# Patient Record
Sex: MALE | Race: WHITE | NOT HISPANIC OR LATINO
[De-identification: names, ages, dates, MRNs, and addresses within clinical notes are randomized per-mention and may not be internally consistent; named-entity substitution may affect disease eponyms.]

---

## 2018-06-02 ENCOUNTER — TRANSCRIPTION ENCOUNTER (OUTPATIENT)
Age: 63
End: 2018-06-02

## 2018-07-06 ENCOUNTER — EMERGENCY (EMERGENCY)
Facility: HOSPITAL | Age: 63
LOS: 1 days | Discharge: HOME | End: 2018-07-06
Attending: EMERGENCY MEDICINE | Admitting: EMERGENCY MEDICINE
Payer: COMMERCIAL

## 2018-07-06 VITALS
OXYGEN SATURATION: 97 % | DIASTOLIC BLOOD PRESSURE: 72 MMHG | TEMPERATURE: 98 F | HEART RATE: 83 BPM | RESPIRATION RATE: 18 BRPM | SYSTOLIC BLOOD PRESSURE: 120 MMHG | WEIGHT: 190.04 LBS

## 2018-07-06 DIAGNOSIS — M79.662 PAIN IN LEFT LOWER LEG: ICD-10-CM

## 2018-07-06 PROCEDURE — 99284 EMERGENCY DEPT VISIT MOD MDM: CPT

## 2018-07-06 PROCEDURE — 93971 EXTREMITY STUDY: CPT | Mod: 26,LT

## 2018-07-06 RX ORDER — IBUPROFEN 200 MG
600 TABLET ORAL ONCE
Qty: 0 | Refills: 0 | Status: COMPLETED | OUTPATIENT
Start: 2018-07-06 | End: 2018-07-06

## 2018-07-06 RX ADMIN — Medication 600 MILLIGRAM(S): at 16:57

## 2018-07-06 NOTE — ED PROVIDER NOTE - OBJECTIVE STATEMENT
63 yo M with no PMHx presents c/o left lower leg pain since yesterday afternoon. Pt states took aspirin for relief. Denies CP, SOB, HA, smoking, N/V, trauma, fall, recent flying, or other complaints.

## 2018-07-06 NOTE — ED ADULT TRIAGE NOTE - CHIEF COMPLAINT QUOTE
c/o left lower leg pain since yesterday afternoon. as per pt, thought it was a Lucas horse, took an aspirin with mild relief. denies CP, SOB, HA, smoking, trauma, fall.

## 2019-11-21 ENCOUNTER — INPATIENT (INPATIENT)
Facility: HOSPITAL | Age: 64
LOS: 3 days | Discharge: HOME | DRG: 65 | End: 2019-11-25
Attending: PSYCHIATRY & NEUROLOGY | Admitting: PSYCHIATRY & NEUROLOGY
Payer: COMMERCIAL

## 2019-11-21 VITALS
HEART RATE: 83 BPM | TEMPERATURE: 98 F | SYSTOLIC BLOOD PRESSURE: 180 MMHG | DIASTOLIC BLOOD PRESSURE: 101 MMHG | WEIGHT: 199.96 LBS | OXYGEN SATURATION: 97 % | RESPIRATION RATE: 18 BRPM

## 2019-11-21 DIAGNOSIS — Z91.89 OTHER SPECIFIED PERSONAL RISK FACTORS, NOT ELSEWHERE CLASSIFIED: ICD-10-CM

## 2019-11-21 DIAGNOSIS — Z21 ASYMPTOMATIC HUMAN IMMUNODEFICIENCY VIRUS [HIV] INFECTION STATUS: ICD-10-CM

## 2019-11-21 DIAGNOSIS — I10 ESSENTIAL (PRIMARY) HYPERTENSION: ICD-10-CM

## 2019-11-21 DIAGNOSIS — F32.9 MAJOR DEPRESSIVE DISORDER, SINGLE EPISODE, UNSPECIFIED: ICD-10-CM

## 2019-11-21 DIAGNOSIS — I63.9 CEREBRAL INFARCTION, UNSPECIFIED: ICD-10-CM

## 2019-11-21 DIAGNOSIS — R63.8 OTHER SYMPTOMS AND SIGNS CONCERNING FOOD AND FLUID INTAKE: ICD-10-CM

## 2019-11-21 LAB
ALBUMIN SERPL ELPH-MCNC: 3.4 G/DL — SIGNIFICANT CHANGE UP (ref 3.4–5)
ALP SERPL-CCNC: 68 U/L — SIGNIFICANT CHANGE UP (ref 40–120)
ALT FLD-CCNC: 43 U/L — HIGH (ref 12–42)
ANION GAP SERPL CALC-SCNC: 2 MMOL/L — LOW (ref 9–16)
APPEARANCE UR: CLEAR — SIGNIFICANT CHANGE UP
APTT BLD: 27.1 SEC — LOW (ref 27.5–36.3)
AST SERPL-CCNC: 23 U/L — SIGNIFICANT CHANGE UP (ref 15–37)
BASOPHILS # BLD AUTO: 0.04 K/UL — SIGNIFICANT CHANGE UP (ref 0–0.2)
BASOPHILS NFR BLD AUTO: 0.4 % — SIGNIFICANT CHANGE UP (ref 0–2)
BILIRUB SERPL-MCNC: 0.3 MG/DL — SIGNIFICANT CHANGE UP (ref 0.2–1.2)
BILIRUB UR-MCNC: NEGATIVE — SIGNIFICANT CHANGE UP
BUN SERPL-MCNC: 16 MG/DL — SIGNIFICANT CHANGE UP (ref 7–23)
CALCIUM SERPL-MCNC: 10.4 MG/DL — SIGNIFICANT CHANGE UP (ref 8.5–10.5)
CHLORIDE SERPL-SCNC: 109 MMOL/L — HIGH (ref 96–108)
CO2 SERPL-SCNC: 33 MMOL/L — HIGH (ref 22–31)
COLOR SPEC: YELLOW — SIGNIFICANT CHANGE UP
CREAT SERPL-MCNC: 1.25 MG/DL — SIGNIFICANT CHANGE UP (ref 0.5–1.3)
DIFF PNL FLD: NEGATIVE — SIGNIFICANT CHANGE UP
EOSINOPHIL # BLD AUTO: 0.17 K/UL — SIGNIFICANT CHANGE UP (ref 0–0.5)
EOSINOPHIL NFR BLD AUTO: 1.9 % — SIGNIFICANT CHANGE UP (ref 0–6)
GLUCOSE SERPL-MCNC: 97 MG/DL — SIGNIFICANT CHANGE UP (ref 70–99)
GLUCOSE UR QL: NEGATIVE — SIGNIFICANT CHANGE UP
HBA1C BLD-MCNC: 6 % — HIGH (ref 4–5.6)
HCT VFR BLD CALC: 45 % — SIGNIFICANT CHANGE UP (ref 39–50)
HGB BLD-MCNC: 15.2 G/DL — SIGNIFICANT CHANGE UP (ref 13–17)
IMM GRANULOCYTES NFR BLD AUTO: 0.4 % — SIGNIFICANT CHANGE UP (ref 0–1.5)
INR BLD: 0.98 — SIGNIFICANT CHANGE UP (ref 0.88–1.16)
KETONES UR-MCNC: NEGATIVE — SIGNIFICANT CHANGE UP
LEUKOCYTE ESTERASE UR-ACNC: NEGATIVE — SIGNIFICANT CHANGE UP
LYMPHOCYTES # BLD AUTO: 2.55 K/UL — SIGNIFICANT CHANGE UP (ref 1–3.3)
LYMPHOCYTES # BLD AUTO: 28.3 % — SIGNIFICANT CHANGE UP (ref 13–44)
MCHC RBC-ENTMCNC: 33.6 PG — SIGNIFICANT CHANGE UP (ref 27–34)
MCHC RBC-ENTMCNC: 33.8 GM/DL — SIGNIFICANT CHANGE UP (ref 32–36)
MCV RBC AUTO: 99.3 FL — SIGNIFICANT CHANGE UP (ref 80–100)
MONOCYTES # BLD AUTO: 1 K/UL — HIGH (ref 0–0.9)
MONOCYTES NFR BLD AUTO: 11.1 % — SIGNIFICANT CHANGE UP (ref 2–14)
NEUTROPHILS # BLD AUTO: 5.2 K/UL — SIGNIFICANT CHANGE UP (ref 1.8–7.4)
NEUTROPHILS NFR BLD AUTO: 57.9 % — SIGNIFICANT CHANGE UP (ref 43–77)
NITRITE UR-MCNC: NEGATIVE — SIGNIFICANT CHANGE UP
NRBC # BLD: 0 /100 WBCS — SIGNIFICANT CHANGE UP (ref 0–0)
PH UR: 7 — SIGNIFICANT CHANGE UP (ref 5–8)
PLATELET # BLD AUTO: 165 K/UL — SIGNIFICANT CHANGE UP (ref 150–400)
POTASSIUM SERPL-MCNC: 4.2 MMOL/L — SIGNIFICANT CHANGE UP (ref 3.5–5.3)
POTASSIUM SERPL-SCNC: 4.2 MMOL/L — SIGNIFICANT CHANGE UP (ref 3.5–5.3)
PROT SERPL-MCNC: 7 G/DL — SIGNIFICANT CHANGE UP (ref 6.4–8.2)
PROT UR-MCNC: NEGATIVE MG/DL — SIGNIFICANT CHANGE UP
PROTHROM AB SERPL-ACNC: 10.9 SEC — SIGNIFICANT CHANGE UP (ref 10–12.9)
RBC # BLD: 4.53 M/UL — SIGNIFICANT CHANGE UP (ref 4.2–5.8)
RBC # FLD: 12.9 % — SIGNIFICANT CHANGE UP (ref 10.3–14.5)
SODIUM SERPL-SCNC: 144 MMOL/L — SIGNIFICANT CHANGE UP (ref 132–145)
SP GR SPEC: <=1.005 — SIGNIFICANT CHANGE UP (ref 1–1.03)
TROPONIN I SERPL-MCNC: <0.017 NG/ML — LOW (ref 0.02–0.06)
UROBILINOGEN FLD QL: 0.2 E.U./DL — SIGNIFICANT CHANGE UP
WBC # BLD: 9 K/UL — SIGNIFICANT CHANGE UP (ref 3.8–10.5)
WBC # FLD AUTO: 9 K/UL — SIGNIFICANT CHANGE UP (ref 3.8–10.5)

## 2019-11-21 PROCEDURE — 70496 CT ANGIOGRAPHY HEAD: CPT | Mod: 26

## 2019-11-21 PROCEDURE — 0042T: CPT | Mod: 26

## 2019-11-21 PROCEDURE — 93010 ELECTROCARDIOGRAM REPORT: CPT

## 2019-11-21 PROCEDURE — 70498 CT ANGIOGRAPHY NECK: CPT | Mod: 26

## 2019-11-21 PROCEDURE — 99223 1ST HOSP IP/OBS HIGH 75: CPT

## 2019-11-21 PROCEDURE — 99291 CRITICAL CARE FIRST HOUR: CPT

## 2019-11-21 RX ORDER — BICTEGRAVIR SODIUM, EMTRICITABINE, AND TENOFOVIR ALAFENAMIDE FUMARATE 30; 120; 15 MG/1; MG/1; MG/1
1 TABLET ORAL DAILY
Refills: 0 | Status: DISCONTINUED | OUTPATIENT
Start: 2019-11-21 | End: 2019-11-25

## 2019-11-21 RX ORDER — CLOPIDOGREL BISULFATE 75 MG/1
75 TABLET, FILM COATED ORAL ONCE
Refills: 0 | Status: COMPLETED | OUTPATIENT
Start: 2019-11-21 | End: 2019-11-21

## 2019-11-21 RX ORDER — CITALOPRAM 10 MG/1
20 TABLET, FILM COATED ORAL DAILY
Refills: 0 | Status: DISCONTINUED | OUTPATIENT
Start: 2019-11-21 | End: 2019-11-25

## 2019-11-21 RX ORDER — ATORVASTATIN CALCIUM 80 MG/1
40 TABLET, FILM COATED ORAL ONCE
Refills: 0 | Status: COMPLETED | OUTPATIENT
Start: 2019-11-21 | End: 2019-11-21

## 2019-11-21 RX ORDER — ASPIRIN/CALCIUM CARB/MAGNESIUM 324 MG
81 TABLET ORAL DAILY
Refills: 0 | Status: DISCONTINUED | OUTPATIENT
Start: 2019-11-22 | End: 2019-11-22

## 2019-11-21 RX ORDER — ASPIRIN/CALCIUM CARB/MAGNESIUM 324 MG
325 TABLET ORAL ONCE
Refills: 0 | Status: COMPLETED | OUTPATIENT
Start: 2019-11-21 | End: 2019-11-21

## 2019-11-21 RX ORDER — CLOPIDOGREL BISULFATE 75 MG/1
75 TABLET, FILM COATED ORAL DAILY
Refills: 0 | Status: DISCONTINUED | OUTPATIENT
Start: 2019-11-22 | End: 2019-11-22

## 2019-11-21 RX ORDER — ATORVASTATIN CALCIUM 80 MG/1
40 TABLET, FILM COATED ORAL AT BEDTIME
Refills: 0 | Status: DISCONTINUED | OUTPATIENT
Start: 2019-11-22 | End: 2019-11-23

## 2019-11-21 RX ADMIN — Medication 325 MILLIGRAM(S): at 18:32

## 2019-11-21 RX ADMIN — CLOPIDOGREL BISULFATE 75 MILLIGRAM(S): 75 TABLET, FILM COATED ORAL at 18:33

## 2019-11-21 RX ADMIN — ATORVASTATIN CALCIUM 40 MILLIGRAM(S): 80 TABLET, FILM COATED ORAL at 18:33

## 2019-11-21 NOTE — ED PROVIDER NOTE - CLINICAL SUMMARY MEDICAL DECISION MAKING FREE TEXT BOX
Pt presenting with right hand numbness, tingling, and weakness since 5:00am this morning. Stroke code activated at 15:55.

## 2019-11-21 NOTE — H&P ADULT - HISTORY OF PRESENT ILLNESS
CT brain: Findings are suspicious for a high left frontal acute infarction. No evidence of acute hemorrhage.  CT angio: Normal CTA of the extracranial circulation. No evidence of carotid stenosis.  CT perfusion : There are scattered bilateral areas of elevated Tmax that do not fit a single vascular territory; these findings are of questionable diagnostic value. VITALS at [facility]: /101, PULSE 83, AFEBRILE, SATTING 97, RR 18   LABS: CBC unremarkable, BMP with bicarb 33, chloride 109, alt 43, ast 23, UA negative   CT brain: Findings are suspicious for a high left frontal acute infarction. No evidence of acute hemorrhage.  CT angio: Normal CTA of the extracranial circulation. No evidence of carotid stenosis.  CT perfusion : There are scattered bilateral areas of elevated Tmax that do not fit a single vascular territory; these findings are of questionable diagnostic value. [facility] ED Course  Vitals: /101, HR 83, Afebrile, Saturation 97%, RR 18  Labs: CBC unremarkable, BMP with bicarb 33, chloride 109, ALT 43, AST 23, UA negative  Imaging:  CT Brain: Findings are suspicious for a high left frontal acute infarction. No evidence of acute hemorrhage.  CT Angio: Normal CTA of the extracranial circulation. No evidence of carotid stenosis.  CT Perfusion: There are scattered bilateral areas of elevated T-Max that do not fit a single vascular territory; these findings are of questionable diagnostic value. Mr. Ahmadi is a 64-year-old male with a past medical history of HTN and HIV who presented with right hand numbness, tingling, and weakness since 5AM on the day of admission. His last known well is 9PM on 11/20/2019. He states that he woke up and noticed his symptoms, especially when brushing his teeth and showering, as he is right hand dominant. He did note that his symptoms mildly subsided but because they persisted, he presented to [facility] ED.    [facility] ED Course  Vitals: /101, HR 83, Afebrile, Saturation 97%, RR 18  Labs: CBC unremarkable, BMP with bicarb 33, chloride 109, ALT 43, AST 23, UA negative  Imaging:  CT Brain: Findings are suspicious for a high left frontal acute infarction. No evidence of acute hemorrhage.  CT Angio: Normal CTA of the extracranial circulation. No evidence of carotid stenosis.  CT Perfusion: There are scattered bilateral areas of elevated T-Max that do not fit a single vascular territory; these findings are of questionable diagnostic value. Mr. Ahmadi is a 64-year-old male with a past medical history of HTN and HIV, anxiety,  who presented with right hand numbness, tingling, and weakness since 5AM on the day of admission. His last known well is 9PM on 11/20/2019. He states that he woke up and noticed his symptoms, especially when brushing his teeth and showering, as he is right hand dominant. He did note that his symptoms mildly subsided but because they persisted, he presented to [facility] ED. Pt denies any loc, no dizziness, no chest pain, no palpitations. Upon arrival to [facility] stroke code called . NIHH 2 upon arrival for right arm drift with mild sensory loss. Patient     [facility] ED Course  Vitals: /101, HR 83, Afebrile, Saturation 97%, RR 18  Labs: CBC unremarkable, BMP with bicarb 33, chloride 109, ALT 43, AST 23, UA negative  Imaging:  CT Brain: Findings are suspicious for a high left frontal acute infarction. No evidence of acute hemorrhage.  CT Angio: Normal CTA of the extracranial circulation. No evidence of carotid stenosis.  CT Perfusion: There are scattered bilateral areas of elevated T-Max that do not fit a single vascular territory; these findings are of questionable diagnostic value.  Patient admitted to [facility] for r/o stroke. Mr. Ahmadi is a 64-year-old male with a past medical history of HTN and HIV, anxiety,  who presented with right hand numbness, tingling, and weakness since 5AM on the day of admission. His last known well is 9PM on 11/20/2019. He states that he woke up and noticed his symptoms, especially when brushing his teeth and showering, as he is right hand dominant. He did note that his symptoms mildly subsided but because they persisted, he presented to [facility] ED. Pt denies any loc, no dizziness, no chest pain, no palpitations. No abdominal pain, no nausea, no vomiting, he denies vertigo and all other ros. Upon arrival to [facility] stroke code called . NIHH 2 upon arrival for right arm drift with mild sensory loss. Patient on exam with RUE sensory and motor deficits. Mild right sided facial droop notable .     [facility] ED Course  Vitals: /101, HR 83, Afebrile, Saturation 97%, RR 18  Labs: CBC unremarkable, BMP with bicarb 33, chloride 109, ALT 43, AST 23, UA negative  Imaging:  CT Brain: Findings are suspicious for a high left frontal acute infarction. No evidence of acute hemorrhage.  CT Angio: Normal CTA of the extracranial circulation. No evidence of carotid stenosis.  CT Perfusion: There are scattered bilateral areas of elevated T-Max that do not fit a single vascular territory; these findings are of questionable diagnostic value.  Patient admitted to [facility] for r/o stroke.

## 2019-11-21 NOTE — ED ADULT NURSE NOTE - OBJECTIVE STATEMENT
pt. presents to the ED c/o numbness and paralysis to the right hand. Pt. reports he fell asleep at 9pm feeling normal and awoke by his alarm at 5am with numbness and paralysis from the right shoulder down to the hand. Pt. reports numbness and paralysis have decreased significantly but he is still experiencing some in his right hand.     Pt. was sent to ct-scan and stroke team was activated right after triage.

## 2019-11-21 NOTE — STROKE CODE NOTE - SUBJECTIVE
PASCALEW at 9pm. Woke up at 5 am with whole forearm and hand numbness and weakness that has gotten better but he still has right hand extensor and flexor wrist and finger weakness.     BP 190s when he presented to the ED  Head Ct shows a left frontal infarct

## 2019-11-21 NOTE — H&P ADULT - PROBLEM SELECTOR PLAN 5
1) PCP Contacted on Admission: (Y/N) --> Name & Phone #:  2) Date of Contact with PCP:  3) PCP Contacted at Discharge: (Y/N)  4) Summary of Handoff Given to PCP:   5) Post-Discharge Appointment Date and Location: F: None  E: Replete PRN  N: NPO for STACI  GI PPX: None  DVT PPX: None  Code: Full  Dispo: [facility]

## 2019-11-21 NOTE — H&P ADULT - PROBLEM SELECTOR PLAN 4
F: None  E: Replete PRN  N: NPO for STACI  GI PPX: None  DVT PPX: None  Code: Full  Dispo: [facility] Known history of depression taking Citalopram 20mg QD at home    -Continue Citalopram Known history of depression taking Citalopram 20mg QD at home  -Continue Citalopram Known history of depression taking Citalopram 20mg QD at home & Bupropion 100 mg qd  -Continue Citalopram  -Continue bupropion 100 mg

## 2019-11-21 NOTE — STROKE CODE NOTE - OBJECTIVE
alert and oriented x 3  no speech or lang defect  CN 2-12 - right facial droop. ? left visual field cut  Motor  - right drift and weakness of right wrist and finger flexors and extensors  sensory intact to LT  Coord FTN intact  gait not checked

## 2019-11-21 NOTE — H&P ADULT - ASSESSMENT
Mr. Ahmadi is a 64-year-old male with a past medical history of HTN and HIV who presented with right hand numbness, tingling, and weakness since 5AM on the day of admission. He was found to have a right frontal infarct on CT. Admitted to [facility] for further workup.

## 2019-11-21 NOTE — ED ADULT TRIAGE NOTE - CHIEF COMPLAINT QUOTE
Pt presents to ED c/o right hand numbness since awakening this morning 0500, reports some sensation returning throughout the day. Denies any other symptoms. Pt is A&Ox3, ambulating with steady gait without assistance, speaking in full complete sentences. FS in triage 92.

## 2019-11-21 NOTE — H&P ADULT - PROBLEM SELECTOR PLAN 3
Known history of hypertension taking Bisoprolol 10mg QD and Valsartan/HCTZ 320mg/25mg QD at home.    -Holding home anti-hypertensives for permissive hypertension up to 200 SBP Known history of hypertension taking Bisoprolol 10mg QD and Valsartan/HCTZ 320mg/25mg QD at home.  -Holding home anti-hypertensives for permissive hypertension up to 200 SBP

## 2019-11-21 NOTE — H&P ADULT - NSHPLABSRESULTS_GEN_ALL_CORE
.  LABS:                         15.2   9.00  )-----------( 165      ( 21 Nov 2019 16:03 )             45.0     11-21    144  |  109<H>  |  16  ----------------------------<  97  4.2   |  33<H>  |  1.25    Ca    10.4      21 Nov 2019 16:03    TPro  7.0  /  Alb  3.4  /  TBili  0.3  /  DBili  x   /  AST  23  /  ALT  43<H>  /  AlkPhos  68  11-21    PT/INR - ( 21 Nov 2019 16:03 )   PT: 10.9 sec;   INR: 0.98          PTT - ( 21 Nov 2019 16:03 )  PTT:27.1 sec  Urinalysis Basic - ( 21 Nov 2019 17:40 )    Color: Yellow / Appearance: Clear / SG: <=1.005 / pH: x  Gluc: x / Ketone: NEGATIVE  / Bili: NEGATIVE / Urobili: 0.2 E.U./dL   Blood: x / Protein: NEGATIVE mg/dL / Nitrite: NEGATIVE   Leuk Esterase: NEGATIVE / RBC: x / WBC x   Sq Epi: x / Non Sq Epi: x / Bacteria: x      CARDIAC MARKERS ( 21 Nov 2019 16:03 )  <0.017 ng/mL / x     / x     / x     / x                RADIOLOGY, EKG & ADDITIONAL TESTS: Reviewed.   < from: CT Angio Neck w/ IV Cont (11.21.19 @ 16:37) >      Impression: Normal CTA of the extracranial circulation. No evidence of   carotid stenosis.      < end of copied text >    < from: CT Brain Stroke Protocol (11.21.19 @ 16:15) >    Impression: Findings are suspicious for a high left frontal acute   infarction. No evidence of acute hemorrhage.    < end of copied text > LABS:                         15.2   9.00  )-----------( 165      ( 21 Nov 2019 16:03 )             45.0     11-21    144  |  109<H>  |  16  ----------------------------<  97  4.2   |  33<H>  |  1.25    Ca    10.4      21 Nov 2019 16:03    TPro  7.0  /  Alb  3.4  /  TBili  0.3  /  DBili  x   /  AST  23  /  ALT  43<H>  /  AlkPhos  68  11-21    PT/INR - ( 21 Nov 2019 16:03 )   PT: 10.9 sec;   INR: 0.98          PTT - ( 21 Nov 2019 16:03 )  PTT:27.1 sec  Urinalysis Basic - ( 21 Nov 2019 17:40 )    Color: Yellow / Appearance: Clear / SG: <=1.005 / pH: x  Gluc: x / Ketone: NEGATIVE  / Bili: NEGATIVE / Urobili: 0.2 E.U./dL   Blood: x / Protein: NEGATIVE mg/dL / Nitrite: NEGATIVE   Leuk Esterase: NEGATIVE / RBC: x / WBC x   Sq Epi: x / Non Sq Epi: x / Bacteria: x      CARDIAC MARKERS ( 21 Nov 2019 16:03 )  <0.017 ng/mL / x     / x     / x     / x                RADIOLOGY, EKG & ADDITIONAL TESTS: Reviewed.   < from: CT Angio Neck w/ IV Cont (11.21.19 @ 16:37) >      Impression: Normal CTA of the extracranial circulation. No evidence of   carotid stenosis.      < end of copied text >    < from: CT Brain Stroke Protocol (11.21.19 @ 16:15) >    Impression: Findings are suspicious for a high left frontal acute   infarction. No evidence of acute hemorrhage.    < end of copied text >

## 2019-11-21 NOTE — H&P ADULT - PROBLEM SELECTOR PLAN 1
Presented with right hand numbness, tingling, and weakness. Stroke code called and NIHSS 2. CT head with left frontal infarct.    -STACI with doppler  -MRI  -Aspirin 81m QD  -Plavix 75mg QD  -Atorvastatin 40mg QD  -Permissive hypertension as high as 200 SBP  -NPO after midnight for STACI  -PT consult  -OT consult  -Speech & Swallow consult Presented with right hand numbness, tingling, and weakness. Stroke code called and NIHSS 2. CT head with left frontal infarct.  - ASA 325mg po once, plavix 75  and Atorvastatin 40mg po once given in ED  CT Brain: Findings are suspicious for a high left frontal acute infarction. No evidence of acute hemorrhage.  CT Angio: Normal CTA of the extracranial circulation. No evidence of carotid stenosis.  CT Perfusion: There are scattered bilateral areas of elevated T-Max that do not fit a single vascular territory; these findings are of questionable diagnostic value.  - MRI head ordered  -npo for yasmeen in am   - Dysphagia screen  - TSH, Lipids, coags, HgA1c ordered  - permissive HTN with SBP goal 140-220, hold antihypertensives  - continue with  ASA 81mg daily, Plavix 75 mg and Atorvastatin 40mg qhs   - PT/OT consulted  - Neuro checks q4hs Presented with right hand numbness, tingling, and weakness. Stroke code called and NIHSS 2. CT head with left frontal infarct.  Exam with right upper extremity with 4/5 strength , mild sensory deficits(He is able to feel my hand but says it feels different ). Right hand with weak finger flexion and extension . Patient with right eye left sided visual defects. right sided facial droop.   - ASA 325mg po once, plavix 75  and Atorvastatin 40mg po once given in ED  CT Brain: Findings are suspicious for a high left frontal acute infarction. No evidence of acute hemorrhage.  CT Angio: Normal CTA of the extracranial circulation. No evidence of carotid stenosis.  CT Perfusion: There are scattered bilateral areas of elevated T-Max that do not fit a single vascular territory; these findings are of questionable diagnostic value.  - MRI head ordered  -npo for yasmeen in am   - Dysphagia screen  - TSH, Lipids, coags, HgA1c ordered  - permissive HTN with SBP goal 140-220, hold antihypertensives  - continue with  ASA 81mg daily, Plavix 75 mg and Atorvastatin 40mg qhs   - PT/OT consulted  - Neuro checks q4hs

## 2019-11-21 NOTE — H&P ADULT - NSHPPHYSICALEXAM_GEN_ALL_CORE
PHYSICAL EXAM:  General: In no acute distress, resting comfortably in bed  HEENT: NCAT, PERRL, EOMI, no conjunctival pallor or scleral icterus, MMM  Neck: Supple, no JVD  Respiratory: Clear to auscultation bilaterally with no wheezes, rales, or rhonchi appreciated  Cardiovascular: RRR, normal S1 and S2, no murmurs, rubs, or gallops appreciated  Vascular: 2+ radial and DP pulses  Abdomen: Soft, NT/ND. Bowel sounds present in all four quadrants with no guarding, rebound tenderness, or palpable masses  Extremities: Warm and well perfused. No clubbing, cyanosis, or edema noted  Skin: No gross skin abnormalities or rashes noted  Neuro:  - Mental Status:  AAOx3; speech is fluent with intact naming, repetition, and comprehension  - Cranial Nerves II-XII:    II:  PERRLA; visual fields are full to confrontation  III, IV, VI:  EOMI, no nystagmus  V:  facial sensation is intact in the V1-V3 distribution bilaterally.  VII:  face is symmetric with normal eye closure and smile  VIII:  hearing is intact to finger rub  IX, X:  uvula is midline and soft palate rises symmetrically  XI:  head turning and shoulder shrug are intact bilaterally  XII:  tongue protrudes in the midline  - Motor:  strength is 5/5 throughout; normal muscle bulk and tone throughout; no pronator drift  - Reflexes:  2+ and symmetric at the biceps, triceps, brachioradialis, knees, and ankles;  plantar reflexes downgoing bilaterally  - Sensory:  intact to light touch, pin prick, vibration, and joint-position sense throughout  - Coordination:  finger-nose-finger and heel-knee-shin intact without dysmetria; rapid alternating hand movements intact  - Gait:  normal steps, base, arm swing, and turning; heel and toe walking are normal; tandem gait is normal; Romberg testing is negative .  VITAL SIGNS:  T(C): 37.4 (11-21-19 @ 20:53), Max: 37.4 (11-21-19 @ 20:53)  T(F): 99.3 (11-21-19 @ 20:53), Max: 99.3 (11-21-19 @ 20:53)  HR: 56 (11-21-19 @ 20:37) (50 - 83)  BP: 157/84 (11-21-19 @ 20:37) (151/80 - 188/101)  BP(mean): 111 (11-21-19 @ 20:37) (111 - 111)  RR: 16 (11-21-19 @ 20:37) (16 - 18)  SpO2: 97% (11-21-19 @ 20:37) (97% - 100%)  Wt(kg): --    PHYSICAL EXAM:    Constitutional: WDWN resting comfortably in bed; NAD  Head: NC/AT  Eyes: PERRL, EOMI, anicteric sclera  ENT: no nasal discharge; uvula midline, no oropharyngeal erythema or exudates; MMM  Neck: supple; no JVD or thyromegaly  Respiratory: CTA B/L; no W/R/R, no retractions  Cardiac: +S1/S2; RRR; no M/R/G; PMI non-displaced  Gastrointestinal: abdomen soft, NT/ND; no rebound or guarding; +BSx4  Back: spine midline, no bony tenderness or step-offs; no CVAT B/L  Extremities: WWP, no clubbing or cyanosis; no peripheral edema  Musculoskeletal: NROM x4; no joint swelling, tenderness or erythema  Vascular: 2+ radial, femoral, DP/PT pulses B/L  Dermatologic: skin warm, dry and intact; no rashes, wounds, or scars  Lymphatic: no submandibular or cervical LAD  Neurologic: AAOx3; right upper extremity with 4/5 strength , mild sensory deficits(He is able to feel my hand but says it feels different ). Right hand with weak finger flexion and extension . Patient with right eye left sided visual defects.   Psychiatric: affect and characteristics of appearance, verbalizations, behaviors are appropriate .  VITAL SIGNS:  T(C): 37.4 (11-21-19 @ 20:53), Max: 37.4 (11-21-19 @ 20:53)  T(F): 99.3 (11-21-19 @ 20:53), Max: 99.3 (11-21-19 @ 20:53)  HR: 56 (11-21-19 @ 20:37) (50 - 83)  BP: 157/84 (11-21-19 @ 20:37) (151/80 - 188/101)  BP(mean): 111 (11-21-19 @ 20:37) (111 - 111)  RR: 16 (11-21-19 @ 20:37) (16 - 18)  SpO2: 97% (11-21-19 @ 20:37) (97% - 100%)  Wt(kg): --    PHYSICAL EXAM:    Constitutional: WDWN resting comfortably in bed; NAD  Head: NC/AT  Eyes: PERRL, EOMI, anicteric sclera  ENT: no nasal discharge; uvula midline, no oropharyngeal erythema or exudates; MMM  Neck: supple; no JVD or thyromegaly  Respiratory: CTA B/L; no W/R/R, no retractions  Cardiac: +S1/S2; RRR; no M/R/G; PMI non-displaced  Gastrointestinal: abdomen soft, NT/ND; no rebound or guarding; +BSx4  Back: spine midline, no bony tenderness or step-offs; no CVAT B/L  Extremities: WWP, no clubbing or cyanosis; no peripheral edema  Musculoskeletal: NROM x4; no joint swelling, tenderness or erythema  Vascular: 2+ radial, femoral, DP/PT pulses B/L  Dermatologic: skin warm, dry and intact; no rashes, wounds, or scars  Lymphatic: no submandibular or cervical LAD  Neurologic: AAOx3; right upper extremity with 4/5 strength , mild sensory deficits(He is able to feel my hand but says it feels different ). Right hand with weak finger flexion and extension . Patient with right eye left sided visual defects. right sided facial droop.   Psychiatric: affect and characteristics of appearance, verbalizations, behaviors are appropriate

## 2019-11-21 NOTE — ED ADULT NURSE NOTE - NSIMPLEMENTINTERV_GEN_ALL_ED
Implemented All Fall with Harm Risk Interventions:  [place] to call system. Call bell, personal items and telephone within reach. Instruct patient to call for assistance. Room bathroom lighting operational. Non-slip footwear when patient is off stretcher. Physically safe environment: no spills, clutter or unnecessary equipment. Stretcher in lowest position, wheels locked, appropriate side rails in place. Provide visual cue, wrist band, yellow gown, etc. Monitor gait and stability. Monitor for mental status changes and reorient to person, place, and time. Review medications for side effects contributing to fall risk. Reinforce activity limits and safety measures with patient and family. Provide visual clues: red socks.

## 2019-11-21 NOTE — ED PROVIDER NOTE - OBJECTIVE STATEMENT
65 y/o male with PMHx of HTN and HIV (undectable viral load) presents to the ED with complaints of right hand numbness, tingling, and weakness since awakening this morning @5:00am. Pt states he slept last night at 9pm asymptomatic. This morning, his alarm woke him up at 5:00am when he noticed right arm numbness and weakness. Pt assumes he must have slept on the arm in the wrong position. Pt had difficulty brushing his teeth and taking a shower this morning due to his arm. (Pt is RHD). Throughout the day, Pt reports sensation returning to right arm but is still c/o numbness and tingling in right arm. Denies any other symptoms.

## 2019-11-22 DIAGNOSIS — I31.3 PERICARDIAL EFFUSION (NONINFLAMMATORY): ICD-10-CM

## 2019-11-22 DIAGNOSIS — N18.3 CHRONIC KIDNEY DISEASE, STAGE 3 (MODERATE): ICD-10-CM

## 2019-11-22 DIAGNOSIS — R00.1 BRADYCARDIA, UNSPECIFIED: ICD-10-CM

## 2019-11-22 DIAGNOSIS — I48.0 PAROXYSMAL ATRIAL FIBRILLATION: ICD-10-CM

## 2019-11-22 LAB
ANION GAP SERPL CALC-SCNC: 7 MMOL/L — SIGNIFICANT CHANGE UP (ref 5–17)
BUN SERPL-MCNC: 15 MG/DL — SIGNIFICANT CHANGE UP (ref 7–23)
CALCIUM SERPL-MCNC: 10.8 MG/DL — HIGH (ref 8.4–10.5)
CHLORIDE SERPL-SCNC: 104 MMOL/L — SIGNIFICANT CHANGE UP (ref 96–108)
CHOLEST SERPL-MCNC: 180 MG/DL — SIGNIFICANT CHANGE UP (ref 10–199)
CO2 SERPL-SCNC: 30 MMOL/L — SIGNIFICANT CHANGE UP (ref 22–31)
CREAT SERPL-MCNC: 1.27 MG/DL — SIGNIFICANT CHANGE UP (ref 0.5–1.3)
ESTIMATED AVERAGE GLUCOSE: 126 MG/DL — HIGH (ref 68–114)
GLUCOSE SERPL-MCNC: 120 MG/DL — HIGH (ref 70–99)
HBA1C BLD-MCNC: 6 % — HIGH (ref 4–5.6)
HCT VFR BLD CALC: 44.1 % — SIGNIFICANT CHANGE UP (ref 39–50)
HCV AB S/CO SERPL IA: 0.1 S/CO — SIGNIFICANT CHANGE UP
HCV AB SERPL-IMP: SIGNIFICANT CHANGE UP
HDLC SERPL-MCNC: 45 MG/DL — SIGNIFICANT CHANGE UP
HGB BLD-MCNC: 15 G/DL — SIGNIFICANT CHANGE UP (ref 13–17)
LIPID PNL WITH DIRECT LDL SERPL: 114 MG/DL — HIGH
MAGNESIUM SERPL-MCNC: 2 MG/DL — SIGNIFICANT CHANGE UP (ref 1.6–2.6)
MCHC RBC-ENTMCNC: 33.7 PG — SIGNIFICANT CHANGE UP (ref 27–34)
MCHC RBC-ENTMCNC: 34 GM/DL — SIGNIFICANT CHANGE UP (ref 32–36)
MCV RBC AUTO: 99.1 FL — SIGNIFICANT CHANGE UP (ref 80–100)
NRBC # BLD: 0 /100 WBCS — SIGNIFICANT CHANGE UP (ref 0–0)
PLATELET # BLD AUTO: 155 K/UL — SIGNIFICANT CHANGE UP (ref 150–400)
POTASSIUM SERPL-MCNC: 4.2 MMOL/L — SIGNIFICANT CHANGE UP (ref 3.5–5.3)
POTASSIUM SERPL-SCNC: 4.2 MMOL/L — SIGNIFICANT CHANGE UP (ref 3.5–5.3)
RBC # BLD: 4.45 M/UL — SIGNIFICANT CHANGE UP (ref 4.2–5.8)
RBC # FLD: 13 % — SIGNIFICANT CHANGE UP (ref 10.3–14.5)
SODIUM SERPL-SCNC: 141 MMOL/L — SIGNIFICANT CHANGE UP (ref 135–145)
TOTAL CHOLESTEROL/HDL RATIO MEASUREMENT: 4 RATIO — SIGNIFICANT CHANGE UP (ref 3.4–9.6)
TRIGL SERPL-MCNC: 103 MG/DL — SIGNIFICANT CHANGE UP (ref 10–149)
TSH SERPL-MCNC: 2.3 UIU/ML — SIGNIFICANT CHANGE UP (ref 0.35–4.94)
WBC # BLD: 8.89 K/UL — SIGNIFICANT CHANGE UP (ref 3.8–10.5)
WBC # FLD AUTO: 8.89 K/UL — SIGNIFICANT CHANGE UP (ref 3.8–10.5)

## 2019-11-22 PROCEDURE — 93306 TTE W/DOPPLER COMPLETE: CPT | Mod: 26

## 2019-11-22 PROCEDURE — 70551 MRI BRAIN STEM W/O DYE: CPT | Mod: 26

## 2019-11-22 RX ORDER — VALSARTAN 80 MG/1
320 TABLET ORAL DAILY
Refills: 0 | Status: DISCONTINUED | OUTPATIENT
Start: 2019-11-22 | End: 2019-11-25

## 2019-11-22 RX ORDER — BISOPROLOL FUMARATE 10 MG/1
1 TABLET, FILM COATED ORAL
Qty: 0 | Refills: 0 | DISCHARGE

## 2019-11-22 RX ORDER — VALACYCLOVIR 500 MG/1
1 TABLET, FILM COATED ORAL
Qty: 0 | Refills: 0 | DISCHARGE

## 2019-11-22 RX ORDER — BUPROPION HYDROCHLORIDE 150 MG/1
1 TABLET, EXTENDED RELEASE ORAL
Qty: 0 | Refills: 0 | DISCHARGE

## 2019-11-22 RX ORDER — ENOXAPARIN SODIUM 100 MG/ML
40 INJECTION SUBCUTANEOUS EVERY 24 HOURS
Refills: 0 | Status: DISCONTINUED | OUTPATIENT
Start: 2019-11-22 | End: 2019-11-22

## 2019-11-22 RX ORDER — BICTEGRAVIR SODIUM, EMTRICITABINE, AND TENOFOVIR ALAFENAMIDE FUMARATE 30; 120; 15 MG/1; MG/1; MG/1
1 TABLET ORAL
Qty: 0 | Refills: 0 | DISCHARGE

## 2019-11-22 RX ORDER — APIXABAN 2.5 MG/1
5 TABLET, FILM COATED ORAL EVERY 12 HOURS
Refills: 0 | Status: DISCONTINUED | OUTPATIENT
Start: 2019-11-22 | End: 2019-11-25

## 2019-11-22 RX ORDER — VALACYCLOVIR 500 MG/1
500 TABLET, FILM COATED ORAL DAILY
Refills: 0 | Status: DISCONTINUED | OUTPATIENT
Start: 2019-11-22 | End: 2019-11-25

## 2019-11-22 RX ORDER — BUPROPION HYDROCHLORIDE 150 MG/1
100 TABLET, EXTENDED RELEASE ORAL DAILY
Refills: 0 | Status: DISCONTINUED | OUTPATIENT
Start: 2019-11-22 | End: 2019-11-25

## 2019-11-22 RX ORDER — CITALOPRAM 10 MG/1
1 TABLET, FILM COATED ORAL
Qty: 0 | Refills: 0 | DISCHARGE

## 2019-11-22 RX ADMIN — APIXABAN 5 MILLIGRAM(S): 2.5 TABLET, FILM COATED ORAL at 15:29

## 2019-11-22 RX ADMIN — CITALOPRAM 20 MILLIGRAM(S): 10 TABLET, FILM COATED ORAL at 12:18

## 2019-11-22 RX ADMIN — Medication 81 MILLIGRAM(S): at 12:18

## 2019-11-22 RX ADMIN — CLOPIDOGREL BISULFATE 75 MILLIGRAM(S): 75 TABLET, FILM COATED ORAL at 12:18

## 2019-11-22 RX ADMIN — VALACYCLOVIR 500 MILLIGRAM(S): 500 TABLET, FILM COATED ORAL at 15:29

## 2019-11-22 RX ADMIN — VALSARTAN 320 MILLIGRAM(S): 80 TABLET ORAL at 15:30

## 2019-11-22 RX ADMIN — ENOXAPARIN SODIUM 40 MILLIGRAM(S): 100 INJECTION SUBCUTANEOUS at 12:41

## 2019-11-22 RX ADMIN — BUPROPION HYDROCHLORIDE 100 MILLIGRAM(S): 150 TABLET, EXTENDED RELEASE ORAL at 15:29

## 2019-11-22 RX ADMIN — BICTEGRAVIR SODIUM, EMTRICITABINE, AND TENOFOVIR ALAFENAMIDE FUMARATE 1 TABLET(S): 30; 120; 15 TABLET ORAL at 12:18

## 2019-11-22 RX ADMIN — ATORVASTATIN CALCIUM 40 MILLIGRAM(S): 80 TABLET, FILM COATED ORAL at 21:05

## 2019-11-22 NOTE — PROGRESS NOTE ADULT - ASSESSMENT
Mr. Ahmadi is a 64-year-old male with a past medical history of HTN and HIV who presented with right hand numbness, tingling, and weakness since 5AM on the day of admission. He was found to have a right frontal infarct on CT. Admitted to [facility] for further workup.

## 2019-11-22 NOTE — PHYSICAL THERAPY INITIAL EVALUATION ADULT - LEVEL OF INDEPENDENCE: STAIR NEGOTIATION, REHAB EVAL
TN met with patient and informed him on continued discharge plan. TN informed pt that he will be moving to ICU today per MD. TN informed that SNF referral will resume once stepdown from ICU. Pt states that he called his sister and left her a message informing her on transfer. Pt has no other questions or concerns at this time.     Future Appointments   Date Time Provider Department Center   7/12/2019  8:00 AM [facility] IR1 [facility] RAD IR [facility]   7/12/2019 10:00 AM SON Rowe MD [facility] TUMOR [facility]   8/2/2019 11:20 AM Leon Barajas DO [facility] IM [place]   8/26/2019 11:00 AM Jaime Carney III, MD [facility] CARDIO [place]        07/03/19 1116   Discharge Reassessment   Assessment Type Discharge Planning Reassessment   Provided patient/caregiver education on the expected discharge date and the discharge plan Yes   Do you have any problems affording any of your prescribed medications? No   Discharge Plan A Skilled Nursing Facility   DME Needed Upon Discharge  none   Patient choice form signed by patient/caregiver N/A   Anticipated Discharge Disposition SNF   Can the patient answer the patient profile reliably? Yes, cognitively intact   How does the patient rate their overall health at the present time? Fair   Describe the patient's ability to walk at the present time. Walks with the help of equipment   How often would a person be available to care for the patient? Infrequently   Number of comorbid conditions (as recorded on the chart) Five or more        independent

## 2019-11-22 NOTE — PROGRESS NOTE ADULT - PROBLEM SELECTOR PLAN 5
Known history of hypertension taking Bisoprolol 10mg QD and Valsartan/HCTZ 320mg/25mg QD at home.  -Resumed Valsartan 320 11/22

## 2019-11-22 NOTE — PROGRESS NOTE ADULT - PROBLEM SELECTOR PLAN 8
Known history of depression taking Citalopram 20mg QD at home & Bupropion 100 mg qd  -Continue Citalopram  -Continue bupropion 100 mg

## 2019-11-22 NOTE — PROGRESS NOTE ADULT - PROBLEM SELECTOR PLAN 1
Presented with right hand numbness, tingling, and weakness. Stroke code called and NIHSS 2. CT head with left frontal infarct.  Exam with right upper extremity with 4/5 strength , mild sensory deficits(He is able to feel my hand but says it feels different ). Right hand with weak finger flexion and extension . Patient with right eye left sided visual defects. right sided facial droop.   - ASA 325mg po once, plavix 75  and Atorvastatin 40mg po once given in ED  CT Brain: Findings are suspicious for a high left frontal acute infarction. No evidence of acute hemorrhage.  CT Angio: Normal CTA of the extracranial circulation. No evidence of carotid stenosis.  CT Perfusion: There are scattered bilateral areas of elevated T-Max that do not fit a single vascular territory; these findings are of questionable diagnostic value.  - MRI head ordered  -npo for yasmeen in am   - Dysphagia screen  - TSH, Lipids, coags, HgbA1c ordered  - permissive HTN with SBP goal 140-220, hold antihypertensives  - continue with  ASA 81mg daily, Plavix 75 mg and Atorvastatin 40mg qhs   - PT/OT consulted  - Neuro checks q4hs

## 2019-11-22 NOTE — PHYSICAL THERAPY INITIAL EVALUATION ADULT - MANUAL MUSCLE TESTING RESULTS, REHAB EVAL
Except Left  5/5 however is weaker than right with decreased finger ABduction and extension/no strength deficits were identified

## 2019-11-22 NOTE — PHYSICAL THERAPY INITIAL EVALUATION ADULT - PERTINENT HX OF CURRENT PROBLEM, REHAB EVAL
64 year old male who presented with right hand numbness, tingling, and weakness since 5AM on the day of admission.

## 2019-11-22 NOTE — PHYSICAL THERAPY INITIAL EVALUATION ADULT - NEUROVASCULAR ASSESSMENT RUE
numbness present/warm/tingling present/predominantly in the area of the thenar area and dorsum of forearm

## 2019-11-22 NOTE — PROGRESS NOTE ADULT - PROBLEM SELECTOR PLAN 3
Known history of HIV on Biktarvy as an outpatient. Undetectable viral load.  -Continue Biktarvy  -Valtrex suppressive therapy for HSV resumed

## 2019-11-23 PROCEDURE — 99233 SBSQ HOSP IP/OBS HIGH 50: CPT

## 2019-11-23 RX ORDER — ATORVASTATIN CALCIUM 80 MG/1
80 TABLET, FILM COATED ORAL AT BEDTIME
Refills: 0 | Status: DISCONTINUED | OUTPATIENT
Start: 2019-11-23 | End: 2019-11-25

## 2019-11-23 RX ADMIN — BUPROPION HYDROCHLORIDE 100 MILLIGRAM(S): 150 TABLET, EXTENDED RELEASE ORAL at 12:15

## 2019-11-23 RX ADMIN — ATORVASTATIN CALCIUM 80 MILLIGRAM(S): 80 TABLET, FILM COATED ORAL at 21:09

## 2019-11-23 RX ADMIN — BICTEGRAVIR SODIUM, EMTRICITABINE, AND TENOFOVIR ALAFENAMIDE FUMARATE 1 TABLET(S): 30; 120; 15 TABLET ORAL at 12:15

## 2019-11-23 RX ADMIN — CITALOPRAM 20 MILLIGRAM(S): 10 TABLET, FILM COATED ORAL at 12:15

## 2019-11-23 RX ADMIN — VALSARTAN 320 MILLIGRAM(S): 80 TABLET ORAL at 05:47

## 2019-11-23 RX ADMIN — APIXABAN 5 MILLIGRAM(S): 2.5 TABLET, FILM COATED ORAL at 05:47

## 2019-11-23 RX ADMIN — APIXABAN 5 MILLIGRAM(S): 2.5 TABLET, FILM COATED ORAL at 18:25

## 2019-11-23 RX ADMIN — VALACYCLOVIR 500 MILLIGRAM(S): 500 TABLET, FILM COATED ORAL at 12:15

## 2019-11-23 NOTE — PROGRESS NOTE ADULT - SUBJECTIVE AND OBJECTIVE BOX
=====================   STROKE ATTENDING NOTE  =====================     MAHIN BERRIOS   MRN-[id number]  Summary:  64y/M with Hypertension HIV anxiety, presented with R hand numbness, tingling, weakness upon waking up.  LKN 9 p.m. . He presented to [facility] ED, NIHSS 2, , CT showed high L frontal acute infarction CTA NEG, CTP ?.  Transferred to [facility] on .      COURSE IN THE HOSPITAL   transferred to [facility]    PMH:  HTN (hypertension) HIV (human immunodeficiency virus infection)  Allergies:  No Known Allergies  Home meds: Biktarvy daily bisoprolol 10mg PO daily bupropion 100mg PO daily citalopram 20mg PO daily valacyclovir daily valsartan HCTZ 320/25 daily     PHYSICAL EXAMINATION  T(C): 36.9 ( @ 10:53), Max: 37 ( @ 05:40) HR: 50 ( @ 08:15) (50 - 66) BP: 148/78 ( @ 08:15) (128/63 - 155/83) RR: 15 ( @ 08:15) (15 - 18) SpO2: 97% ( @ 08:15) (96% - 97%)  NEUROLOGIC EXAMINATION:  Patient is awake, alert, fully oriented, pupils 2-3mm equal and briskly reactive to light, EOMs intact, muscle strength 5/5 on all 4 extremities  GENERAL:  not intubated, not in cardiorespiratory distress  EENT: anicteric  CARDIOVASC:  (+) S1 S2, normal rate and regular rhythm  PULMONARY:  clear to auscultation bilaterally  ABDOMEN:  soft, nontender, with normoactive bowel sounds  EXTREMITIES:  no edema  SKIN:  no rash    LABS:             15.0   8.89  )-----------( 155      ( 2019 07:00 )             44.1     141  |  104  |  15  ----------------------------<  120<H>  4.2   |  30  |  1.27    Ca    10.8<H>      2019 07:00  Mg     2.0         TPro  7.0  /  Alb  3.4  /  TBili  0.3  /  DBili  x   /  AST  23  /  ALT  43<H>  /  AlkPhos  68   @ 07:01  -   @ 11:33  IN: 150 mL / OUT: 0 mL / NET: 150 mL    HbA1C = 6.0 () 6.0 ()  LDL = 114 ()   HDL = 45 ()  TG = 103 ()   TSH = 2.297 ()    Bacteriology:  CSF studies:  EEG:  Neuroimagin/22 MRI:  restricted diffusion L high frontal lobe c/w acute infarction; also L centrum semiovale and L subinsular region and L occipital lobe, likely embolic stroke   CTA: no LVO, no carotid stenosis  Other imagin/22 TTE:  EF normal    MEDICATIONS: apixaban 5mg PO q12h atorvastatin 40mg PO HS HAART daily bupropion 100mg PO daily citalopram 20mg PO daily valacyclovir 500mg PO daily valsartan 320mg PO daily     IV FLUIDS: IVL  DRIPS:  DIET: DASH   Lines:    CODE STATUS:  full code                       GOALS OF CARE:  aggressive                      DISPOSITION:  5La =====================   STROKE ATTENDING NOTE  =====================     MAHIN BERRIOS   MRN-[id number]  Summary:  64y/M with Hypertension HIV anxiety, presented with R hand numbness, tingling, weakness upon waking up.  LKN 9 p.m. . He presented to [facility] ED, NIHSS 2, , CT showed high L frontal acute infarction CTA NEG, CTP ?.  Transferred to [facility] on .      COURSE IN THE HOSPITAL   transferred to [facility]    PMH:  HTN (hypertension) HIV (human immunodeficiency virus infection)  Allergies:  No Known Allergies  Home meds: Biktarvy daily bisoprolol 10mg PO daily bupropion 100mg PO daily citalopram 20mg PO daily valacyclovir daily valsartan HCTZ 320/25 daily     PHYSICAL EXAMINATION  T(C): 36.9 ( @ 10:53), Max: 37 ( @ 05:40) HR: 50 ( @ 08:15) (50 - 66) BP: 148/78 ( @ 08:15) (128/63 - 155/83) RR: 15 ( @ 08:15) (15 - 18) SpO2: 97% ( @ 08:15) (96% - 97%)  NEUROLOGIC EXAMINATION:  Patient is awake, alert, fully oriented, pupils 3mm equal and briskly reactive to light, EOMs intact, muscle strength 5/5 on L UE and B LE, 4/5 R UE, poor fine motor skills on R hand (patient states improved since onset); no sensory deficits noted on all dermatomes  GENERAL:  not intubated, not in cardiorespiratory distress  EENT: anicteric  CARDIOVASC:  (+) S1 S2, normal rate and regular rhythm  PULMONARY:  clear to auscultation bilaterally  ABDOMEN:  soft, nontender, with normoactive bowel sounds  EXTREMITIES:  no edema  SKIN:  no rash    LABS:             15.0   8.89  )-----------( 155      ( 2019 07:00 )             44.1     141  |  104  |  15  ----------------------------<  120<H>  4.2   |  30  |  1.27    Ca    10.8<H>      2019 07:00  Mg     2.0         TPro  7.0  /  Alb  3.4  /  TBili  0.3  /  DBili  x   /  AST  23  /  ALT  43<H>  /  AlkPhos  68   @ 07:01  -   @ 11:33  IN: 150 mL / OUT: 0 mL / NET: 150 mL    HbA1C = 6.0 () 6.0 ()  LDL = 114 ()   HDL = 45 ()  TG = 103 ()   TSH = 2.297 ()    Bacteriology:  CSF studies:  EEG:  Neuroimagin/22 MRI:  restricted diffusion L high frontal lobe c/w acute infarction; also L centrum semiovale and L subinsular region and L occipital lobe, likely embolic stroke   CTA: no LVO, no carotid stenosis  Other imagin/22 TTE:  EF normal    MEDICATIONS: apixaban 5mg PO q12h atorvastatin 40mg PO HS HAART daily bupropion 100mg PO daily citalopram 20mg PO daily valacyclovir 500mg PO daily valsartan 320mg PO daily     IV FLUIDS: IVL  DRIPS:  DIET: DASH   Lines:    CODE STATUS:  full code                       GOALS OF CARE:  aggressive                      DISPOSITION:  5La

## 2019-11-23 NOTE — PROGRESS NOTE ADULT - ASSESSMENT
64y/M with  1.  acute L frontal CVA, L centrum semiovale, L subinsular, L occipital lobe strokes  2.  Hypertension  3.  HIV    PLAN:   NEURO: neurochecks qshift  continue bupropion / citalopram  REHAB:  physical therapy evaluation and management    EARLY MOB:      PULM:  Room air, incentive spirometry, f/u STACI   CARDIO:  SBP goal 120-180mm Hg, continue valsartan, increase atorvastatin to 80mg PO daily   ENDO:  Blood sugar goals 140-180 mg/dL   GI:  bowel regimen  DIET: DASH   RENAL:  IVL  HEM/ONC: Hb stable  VTE Prophylaxis: SCDs, on full AC  ID: afebrile, no leukocytosis, continue HAART, valacyclovir   Social: will update family    ATTENDING ATTESTATION:  I was physically present for the key portions of the evaluation and management (E/M) service provided.  I agree with the above history, physical and plan which I have reviewed and edited where appropriate.     Patient not at high risk for neurologic deterioration / death.  Time spent on this noncritically ill patient: 45 minutes spent on total encounter, more than 50% of the visit was spent counseling and/or coordinating care by the attending physician.    Plan discussed with RN, house staff.    REVIEW OF SYSTEMS:  No headaches, no nausea or vomiting; 14 -point review of systems otherwise unremarkable. 64y/M with  1.  acute L frontal CVA, L centrum semiovale, L subinsular, L occipital lobe strokes  2.  Hypertension  3.  HIV  4.  ?atrial fibrillation noted in the electronic chart, EKGs sinus bradycardia, sinus on current examination - consider paroxysmal atrial fibrillation    PLAN:   NEURO: neurochecks qshift  continue bupropion / citalopram  REHAB:  physical therapy evaluation and management    EARLY MOB:      PULM:  Room air, incentive spirometry, f/u STACI   CARDIO:  SBP goal 120-180mm Hg, continue valsartan, increase atorvastatin to 80mg PO daily; will confirm Afib diagnosis with team - no documentation in chart  ENDO:  Blood sugar goals 140-180 mg/dL   GI:  bowel regimen  DIET: DASH   RENAL:  IVL  HEM/ONC: Hb stable  VTE Prophylaxis: SCDs, on full AC  ID: afebrile, no leukocytosis, continue HAART, valacyclovir   Social: will update family    ATTENDING ATTESTATION:  I was physically present for the key portions of the evaluation and management (E/M) service provided.  I agree with the above history, physical and plan which I have reviewed and edited where appropriate.     Patient not at high risk for neurologic deterioration / death.  Time spent on this noncritically ill patient: 45 minutes spent on total encounter, more than 50% of the visit was spent counseling and/or coordinating care by the attending physician.    Plan discussed with RN, house staff.    REVIEW OF SYSTEMS:  No headaches, no nausea or vomiting; 14 -point review of systems otherwise unremarkable.

## 2019-11-24 PROCEDURE — 99233 SBSQ HOSP IP/OBS HIGH 50: CPT

## 2019-11-24 RX ADMIN — APIXABAN 5 MILLIGRAM(S): 2.5 TABLET, FILM COATED ORAL at 17:49

## 2019-11-24 RX ADMIN — ATORVASTATIN CALCIUM 80 MILLIGRAM(S): 80 TABLET, FILM COATED ORAL at 21:13

## 2019-11-24 RX ADMIN — CITALOPRAM 20 MILLIGRAM(S): 10 TABLET, FILM COATED ORAL at 12:37

## 2019-11-24 RX ADMIN — APIXABAN 5 MILLIGRAM(S): 2.5 TABLET, FILM COATED ORAL at 05:35

## 2019-11-24 RX ADMIN — BUPROPION HYDROCHLORIDE 100 MILLIGRAM(S): 150 TABLET, EXTENDED RELEASE ORAL at 12:37

## 2019-11-24 RX ADMIN — VALSARTAN 320 MILLIGRAM(S): 80 TABLET ORAL at 05:35

## 2019-11-24 RX ADMIN — VALACYCLOVIR 500 MILLIGRAM(S): 500 TABLET, FILM COATED ORAL at 15:36

## 2019-11-24 RX ADMIN — BICTEGRAVIR SODIUM, EMTRICITABINE, AND TENOFOVIR ALAFENAMIDE FUMARATE 1 TABLET(S): 30; 120; 15 TABLET ORAL at 12:38

## 2019-11-24 NOTE — OCCUPATIONAL THERAPY INITIAL EVALUATION ADULT - MANUAL MUSCLE TESTING RESULTS, REHAB EVAL
Noted right facial droop (pt denies any change from PTA) however smile remains symmetrical, tongue protrusion in midline, cheeks puff and eyebrows elevation grossly intact; left upper extremity 5/5 throughout; right upper extremity shoulder/elbow flex/ext 5/5, right wrist flex/ext 4+/5, hand  4/5, thumb MCP/DIP ABD 3+/5, weak intrinsic (3/5) History of appendectomy    Prostate cancer  s/p history of seeding  S/P hip replacement, left  9/2015

## 2019-11-24 NOTE — OCCUPATIONAL THERAPY INITIAL EVALUATION ADULT - PERTINENT HX OF CURRENT PROBLEM, REHAB EVAL
64y/M with Hypertension HIV anxiety, presented with R hand numbness, tingling, weakness upon waking up.  LKN 9 p.m. 11/20. He presented to [facility] ED, NIHSS 2, , CT showed high L frontal acute infarction, L centrum semiovale, L subinsular, L occipital lobe strokes. Transferred to [facility] on 11/21.

## 2019-11-24 NOTE — OCCUPATIONAL THERAPY INITIAL EVALUATION ADULT - COORDINATION ASSESSED, REHAB EVAL
finger to nose/grossly intact left upper extremity, decreased accuracy right upper extremity no franc dysmetria observed grossly intact left upper extremity, decreased accuracy right upper extremity no franc dysmetria observed/finger to nose

## 2019-11-24 NOTE — PROGRESS NOTE ADULT - PROBLEM SELECTOR PLAN 1
Presented with right hand numbness, tingling, and weakness. Stroke code called and NIHSS 2. CT head with left frontal infarct.  - ASA 325mg po once, plavix 75  and Atorvastatin 40mg po once given in ED  CT Brain: Findings are suspicious for a high left frontal acute infarction. No evidence of acute hemorrhage.  CT Angio: Normal CTA of the extracranial circulation. No evidence of carotid stenosis.  CT Perfusion: There are scattered bilateral areas of elevated T-Max that do not fit a single vascular territory; these findings are of questionable diagnostic value.  - MRI head ordered  -npo for yasmeen in 11/25 am   - goal -180 systolic  -  Atorvastatin 40mg qhs   - PT/OT consulted  - Neuro checks q4hs

## 2019-11-24 NOTE — PROGRESS NOTE ADULT - ASSESSMENT
Mr. Ahmadi is a 64-year-old male with a past medical history of HTN and HIV who presented with right hand numbness, tingling, and weakness since 5AM on the day of admission. He was found to have a right frontal infarct on CT. Admitted to [facility] for further workup.

## 2019-11-24 NOTE — OCCUPATIONAL THERAPY INITIAL EVALUATION ADULT - LIGHT TOUCH SENSATION, RUE, REHAB EVAL
reports numbness and paresthesias "pins and needles" both dorsal/palmar aspects of thumb/mild impairment

## 2019-11-24 NOTE — OCCUPATIONAL THERAPY INITIAL EVALUATION ADULT - GENERAL OBSERVATIONS, REHAB EVAL
Right hand dominant. Patient cleared for Occupational Therapy by RN, patient received supine in non-acute distress. +Tele, +IV heplock. Patient denies any pain, no complaint. Reports right hand is "getter better"

## 2019-11-24 NOTE — PROGRESS NOTE ADULT - ASSESSMENT
64y/M with  1.  acute L frontal CVA, L centrum semiovale, L subinsular, L occipital lobe strokes  2.  Hypertension  3.  HIV  4.  ?atrial fibrillation noted in the electronic chart, EKGs sinus bradycardia, sinus on current examination - consider paroxysmal atrial fibrillation    PLAN:   NEURO: neurochecks qshift  continue bupropion / citalopram  REHAB:  physical therapy evaluation and management    EARLY MOB:  OOB to chair, ambulate    PULM:  Room air, incentive spirometry, STACI on Monday  CARDIO:  SBP goal 120-180mm Hg, continue valsartan, ??Afib documented on previous note (hospital day 2) - will confirm with Dr. Frank; continue telemetry, f/u events overnight  ENDO:  Blood sugar goals 140-180 mg/dL, continue high dose statins  GI:  bowel regimen  DIET: DASH, NPO after MN   RENAL:  IVL, start IVF once NPO  HEM/ONC: Hb stable  VTE Prophylaxis: SCDs, on full AC  ID: afebrile, no leukocytosis, continue HAART, valacyclovir   Social: will update family    ATTENDING ATTESTATION:  I was physically present for the key portions of the evaluation and management (E/M) service provided.  I agree with the above history, physical and plan which I have reviewed and edited where appropriate.     Patient not at high risk for neurologic deterioration / death.  Time spent on this noncritically ill patient: 45 minutes spent on total encounter, more than 50% of the visit was spent counseling and/or coordinating care by the attending physician.    Plan discussed with RN, house staff.    REVIEW OF SYSTEMS:  No headaches, no nausea or vomiting; 14 -point review of systems otherwise unremarkable. 64y/M with  1.  acute L frontal CVA, L centrum semiovale, L subinsular, L occipital lobe strokes  2.  Hypertension  3.  HIV  4.  atrial fibrillation, paroxysmal, in controlled ventricular rate    PLAN:   NEURO: neurochecks qshift  continue bupropion / citalopram  REHAB:  physical therapy evaluation and management    EARLY MOB:  OOB to chair, ambulate    PULM:  Room air, incentive spirometry, STACI on Monday  CARDIO:  SBP goal 120-180mm Hg, continue valsartan, documented Afib. continue telemetry   ENDO:  Blood sugar goals 140-180 mg/dL, continue high dose statins  GI:  bowel regimen  DIET: DASH, NPO after MN   RENAL:  IVL, start IVF once NPO  HEM/ONC: Hb stable  VTE Prophylaxis: SCDs, on full AC  ID: afebrile, no leukocytosis, continue HAART, valacyclovir   Social: will update family    ATTENDING ATTESTATION:  I was physically present for the key portions of the evaluation and management (E/M) service provided.  I agree with the above history, physical and plan which I have reviewed and edited where appropriate.     Patient not at high risk for neurologic deterioration / death.  Time spent on this noncritically ill patient: 45 minutes spent on total encounter, more than 50% of the visit was spent counseling and/or coordinating care by the attending physician.    Plan discussed with RN, house staff.    REVIEW OF SYSTEMS:  No headaches, no nausea or vomiting; 14 -point review of systems otherwise unremarkable.

## 2019-11-24 NOTE — PROGRESS NOTE ADULT - SUBJECTIVE AND OBJECTIVE BOX
=====================   STROKE ATTENDING NOTE  =====================     MAHIN BERRIOS   MRN-[id number]  Summary:  64y/M with Hypertension HIV anxiety, presented with R hand numbness, tingling, weakness upon waking up.  LKN 9 p.m. . He presented to [facility] ED, NIHSS 2, , CT showed high L frontal acute infarction CTA NEG, CTP ?.  Transferred to [facility] on .      COURSE IN THE HOSPITAL   transferred to [facility]   No significant events overnight.     PMH:  HTN (hypertension) HIV (human immunodeficiency virus infection)  Allergies:  No Known Allergies  Home meds: Biktarvy daily bisoprolol 10mg PO daily bupropion 100mg PO daily citalopram 20mg PO daily valacyclovir daily valsartan HCTZ 320/25 daily     PHYSICAL EXAMINATION  T(C): 36.7 ( @ 10:10), Max: 37 ( @ 05:00) HR: 54 ( @ 09:00) (48 - 58) BP: 157/80 ( @ 09:00) (127/65 - 162/91) RR: 16 ( @ 09:00) (15 - 17) SpO2: 96% ( @ 09:00) (94% - 98%)  NEUROLOGIC EXAMINATION:  Patient is awake, alert, fully oriented, pupils 3mm equal and briskly reactive to light, EOMs intact, muscle strength 5/5 on L UE and B LE, 4/5 R UE, poor fine motor skills on R hand (patient states improved since onset); no sensory deficits noted on all dermatomes  GENERAL:  not intubated, not in cardiorespiratory distress  EENT: anicteric  CARDIOVASC:  (+) S1 S2, bradycardic, rate and regular rhythm  PULMONARY:  clear to auscultation bilaterally  ABDOMEN:  soft, nontender, with normoactive bowel sounds  EXTREMITIES:  no edema  SKIN:  no rash    LABS: no labs available     @ 07:01  -   @ 07:00  IN: 350 mL / OUT: 0 mL / NET: 350 mL    HbA1C = 6.0 () 6.0 ()  LDL = 114 ()   HDL = 45 ()  TG = 103 ()   TSH = 2.297 ()    Bacteriology:  CSF studies:  EEG:  Neuroimagin/22 MRI:  restricted diffusion L high frontal lobe c/w acute infarction; also L centrum semiovale and L subinsular region and L occipital lobe, likely embolic stroke   CTA: no LVO, no carotid stenosis  Other imagin/22 TTE:  EF normal    MEDICATIONS:   apixaban 5mg PO q12h HAART valacyclovir 500mg PO daily bupropion 100mg PO daily citalopram 20mg PO daily valsartan 320mg PO daily atorvastatin 80mg PO HS    IV FLUIDS: IVL  DRIPS:  DIET: DASH   Lines:    CODE STATUS:  full code                       GOALS OF CARE:  aggressive                      DISPOSITION:  5La

## 2019-11-24 NOTE — PROGRESS NOTE ADULT - SUBJECTIVE AND OBJECTIVE BOX
INTERVAL HPI/OVERNIGHT EVENTS: NAOE    SUBJECTIVE: Patient seen and examined at bedside.    VITAL SIGNS:  ICU Vital Signs Last 24 Hrs  T(C): 37.1 (24 Nov 2019 17:31), Max: 37.1 (24 Nov 2019 17:31)  T(F): 98.7 (24 Nov 2019 17:31), Max: 98.7 (24 Nov 2019 17:31)  HR: 60 (24 Nov 2019 15:42) (48 - 62)  BP: 144/77 (24 Nov 2019 15:42) (139/83 - 162/91)  BP(mean): 94 (24 Nov 2019 15:42) (93 - 114)  ABP: --  ABP(mean): --  RR: 16 (24 Nov 2019 15:42) (16 - 16)  SpO2: 96% (24 Nov 2019 15:42) (94% - 96%)        11-23 @ 07:01  -  11-24 @ 07:00  --------------------------------------------------------  IN: 350 mL / OUT: 0 mL / NET: 350 mL      CAPILLARY BLOOD GLUCOSE          PHYSICAL EXAM:    Constitutional: male, WDWN, NAD  HEENT: PERRL, EOMI, sclera non-icteric, neck supple, trachea midline, no masses, no JVD, MMM, good dentition  Respiratory: CTA b/l, good air entry b/l, no wheezing, no rhonchi, no rales, without accessory muscle use and no intercostal retractions  Cardiovascular: RRR, normal S1S2, no M/R/G  Gastrointestinal: soft, NTND, no masses palpable, BS normal  Extremities: Warm, well perfused, pulses equal bilateral upper and lower extremities, no edema, no clubbing  Neurological: AAOx3, CN Grossly intact  Skin: Normal temperature, warm, dry    MEDICATIONS:  MEDICATIONS  (STANDING):  apixaban 5 milliGRAM(s) Oral every 12 hours  atorvastatin 80 milliGRAM(s) Oral at bedtime  bictegravir 50 mG/emtricitabine 200 mG/tenofovir alafenamide 25 mG (BIKTARVY) 1 Tablet(s) Oral daily  buPROPion  milliGRAM(s) Oral daily  citalopram 20 milliGRAM(s) Oral daily  valACYclovir 500 milliGRAM(s) Oral daily  valsartan 320 milliGRAM(s) Oral daily    MEDICATIONS  (PRN):      ALLERGIES:  Allergies    No Known Allergies    Intolerances        LABS:                RADIOLOGY & ADDITIONAL TESTS: Reviewed.

## 2019-11-25 ENCOUNTER — TRANSCRIPTION ENCOUNTER (OUTPATIENT)
Age: 64
End: 2019-11-25

## 2019-11-25 VITALS — TEMPERATURE: 98 F

## 2019-11-25 DIAGNOSIS — E83.52 HYPERCALCEMIA: ICD-10-CM

## 2019-11-25 DIAGNOSIS — I63.411 CEREBRAL INFARCTION DUE TO EMBOLISM OF RIGHT MIDDLE CEREBRAL ARTERY: ICD-10-CM

## 2019-11-25 DIAGNOSIS — F32.9 MAJOR DEPRESSIVE DISORDER, SINGLE EPISODE, UNSPECIFIED: ICD-10-CM

## 2019-11-25 DIAGNOSIS — E66.3 OVERWEIGHT: ICD-10-CM

## 2019-11-25 DIAGNOSIS — R73.03 PREDIABETES: ICD-10-CM

## 2019-11-25 DIAGNOSIS — N18.2 CHRONIC KIDNEY DISEASE, STAGE 2 (MILD): ICD-10-CM

## 2019-11-25 LAB
ANION GAP SERPL CALC-SCNC: 7 MMOL/L — SIGNIFICANT CHANGE UP (ref 5–17)
BUN SERPL-MCNC: 14 MG/DL — SIGNIFICANT CHANGE UP (ref 7–23)
CALCIUM SERPL-MCNC: 9.9 MG/DL — SIGNIFICANT CHANGE UP (ref 8.4–10.5)
CHLORIDE SERPL-SCNC: 107 MMOL/L — SIGNIFICANT CHANGE UP (ref 96–108)
CO2 SERPL-SCNC: 25 MMOL/L — SIGNIFICANT CHANGE UP (ref 22–31)
CREAT SERPL-MCNC: 1.21 MG/DL — SIGNIFICANT CHANGE UP (ref 0.5–1.3)
GLUCOSE BLDC GLUCOMTR-MCNC: 75 MG/DL — SIGNIFICANT CHANGE UP (ref 70–99)
GLUCOSE SERPL-MCNC: 126 MG/DL — HIGH (ref 70–99)
HCT VFR BLD CALC: 45.4 % — SIGNIFICANT CHANGE UP (ref 39–50)
HGB BLD-MCNC: 15 G/DL — SIGNIFICANT CHANGE UP (ref 13–17)
MAGNESIUM SERPL-MCNC: 2.1 MG/DL — SIGNIFICANT CHANGE UP (ref 1.6–2.6)
MCHC RBC-ENTMCNC: 32.5 PG — SIGNIFICANT CHANGE UP (ref 27–34)
MCHC RBC-ENTMCNC: 33 GM/DL — SIGNIFICANT CHANGE UP (ref 32–36)
MCV RBC AUTO: 98.3 FL — SIGNIFICANT CHANGE UP (ref 80–100)
NRBC # BLD: 0 /100 WBCS — SIGNIFICANT CHANGE UP (ref 0–0)
PHOSPHATE SERPL-MCNC: 1.9 MG/DL — LOW (ref 2.5–4.5)
PLATELET # BLD AUTO: 156 K/UL — SIGNIFICANT CHANGE UP (ref 150–400)
POTASSIUM SERPL-MCNC: 4 MMOL/L — SIGNIFICANT CHANGE UP (ref 3.5–5.3)
POTASSIUM SERPL-SCNC: 4 MMOL/L — SIGNIFICANT CHANGE UP (ref 3.5–5.3)
RBC # BLD: 4.62 M/UL — SIGNIFICANT CHANGE UP (ref 4.2–5.8)
RBC # FLD: 12.7 % — SIGNIFICANT CHANGE UP (ref 10.3–14.5)
SODIUM SERPL-SCNC: 139 MMOL/L — SIGNIFICANT CHANGE UP (ref 135–145)
WBC # BLD: 8.38 K/UL — SIGNIFICANT CHANGE UP (ref 3.8–10.5)
WBC # FLD AUTO: 8.38 K/UL — SIGNIFICANT CHANGE UP (ref 3.8–10.5)

## 2019-11-25 PROCEDURE — 76376 3D RENDER W/INTRP POSTPROCES: CPT | Mod: 26

## 2019-11-25 PROCEDURE — 70450 CT HEAD/BRAIN W/O DYE: CPT

## 2019-11-25 PROCEDURE — 84443 ASSAY THYROID STIM HORMONE: CPT

## 2019-11-25 PROCEDURE — 80061 LIPID PANEL: CPT

## 2019-11-25 PROCEDURE — 0042T: CPT

## 2019-11-25 PROCEDURE — 86803 HEPATITIS C AB TEST: CPT

## 2019-11-25 PROCEDURE — 93312 ECHO TRANSESOPHAGEAL: CPT

## 2019-11-25 PROCEDURE — 99291 CRITICAL CARE FIRST HOUR: CPT | Mod: 25

## 2019-11-25 PROCEDURE — 36415 COLL VENOUS BLD VENIPUNCTURE: CPT

## 2019-11-25 PROCEDURE — 70496 CT ANGIOGRAPHY HEAD: CPT

## 2019-11-25 PROCEDURE — 93005 ELECTROCARDIOGRAM TRACING: CPT

## 2019-11-25 PROCEDURE — 83735 ASSAY OF MAGNESIUM: CPT

## 2019-11-25 PROCEDURE — 97161 PT EVAL LOW COMPLEX 20 MIN: CPT

## 2019-11-25 PROCEDURE — 80048 BASIC METABOLIC PNL TOTAL CA: CPT

## 2019-11-25 PROCEDURE — 70551 MRI BRAIN STEM W/O DYE: CPT

## 2019-11-25 PROCEDURE — 81003 URINALYSIS AUTO W/O SCOPE: CPT

## 2019-11-25 PROCEDURE — 82962 GLUCOSE BLOOD TEST: CPT

## 2019-11-25 PROCEDURE — 84484 ASSAY OF TROPONIN QUANT: CPT

## 2019-11-25 PROCEDURE — 99233 SBSQ HOSP IP/OBS HIGH 50: CPT

## 2019-11-25 PROCEDURE — 83036 HEMOGLOBIN GLYCOSYLATED A1C: CPT

## 2019-11-25 PROCEDURE — 85025 COMPLETE CBC W/AUTO DIFF WBC: CPT

## 2019-11-25 PROCEDURE — 84100 ASSAY OF PHOSPHORUS: CPT

## 2019-11-25 PROCEDURE — 93306 TTE W/DOPPLER COMPLETE: CPT

## 2019-11-25 PROCEDURE — 85730 THROMBOPLASTIN TIME PARTIAL: CPT

## 2019-11-25 PROCEDURE — 70498 CT ANGIOGRAPHY NECK: CPT

## 2019-11-25 PROCEDURE — 85610 PROTHROMBIN TIME: CPT

## 2019-11-25 PROCEDURE — 85027 COMPLETE CBC AUTOMATED: CPT

## 2019-11-25 PROCEDURE — 80053 COMPREHEN METABOLIC PANEL: CPT

## 2019-11-25 PROCEDURE — 93312 ECHO TRANSESOPHAGEAL: CPT | Mod: 26

## 2019-11-25 RX ORDER — APIXABAN 2.5 MG/1
1 TABLET, FILM COATED ORAL
Qty: 60 | Refills: 3
Start: 2019-11-25 | End: 2020-03-23

## 2019-11-25 RX ORDER — ATORVASTATIN CALCIUM 80 MG/1
40 TABLET, FILM COATED ORAL AT BEDTIME
Refills: 0 | Status: DISCONTINUED | OUTPATIENT
Start: 2019-11-25 | End: 2019-11-25

## 2019-11-25 RX ORDER — HYDROCHLOROTHIAZIDE 25 MG
12.5 TABLET ORAL DAILY
Refills: 0 | Status: DISCONTINUED | OUTPATIENT
Start: 2019-11-25 | End: 2019-11-25

## 2019-11-25 RX ORDER — ATORVASTATIN CALCIUM 80 MG/1
1 TABLET, FILM COATED ORAL
Qty: 30 | Refills: 1
Start: 2019-11-25 | End: 2020-01-23

## 2019-11-25 RX ADMIN — CITALOPRAM 20 MILLIGRAM(S): 10 TABLET, FILM COATED ORAL at 19:11

## 2019-11-25 RX ADMIN — VALSARTAN 320 MILLIGRAM(S): 80 TABLET ORAL at 06:09

## 2019-11-25 RX ADMIN — BICTEGRAVIR SODIUM, EMTRICITABINE, AND TENOFOVIR ALAFENAMIDE FUMARATE 1 TABLET(S): 30; 120; 15 TABLET ORAL at 19:11

## 2019-11-25 RX ADMIN — APIXABAN 5 MILLIGRAM(S): 2.5 TABLET, FILM COATED ORAL at 06:09

## 2019-11-25 RX ADMIN — BUPROPION HYDROCHLORIDE 100 MILLIGRAM(S): 150 TABLET, EXTENDED RELEASE ORAL at 19:11

## 2019-11-25 RX ADMIN — APIXABAN 5 MILLIGRAM(S): 2.5 TABLET, FILM COATED ORAL at 19:12

## 2019-11-25 RX ADMIN — Medication 62.5 MILLIMOLE(S): at 17:42

## 2019-11-25 RX ADMIN — VALACYCLOVIR 500 MILLIGRAM(S): 500 TABLET, FILM COATED ORAL at 19:11

## 2019-11-25 NOTE — PROGRESS NOTE ADULT - ASSESSMENT
Mr. Ahmadi is a 64-year-old male with a past medical history of HTN and HIV who presented with right hand numbness, tingling, and weakness since 5AM on the day of admission. He was found to have a right frontal infarct on CT. Admitted to [facility] for further workup.

## 2019-11-25 NOTE — PROGRESS NOTE ADULT - ATTENDING COMMENTS
Patient seen and examined with Resident.  Agree with above.  He complained of right hand numbness that has mostly resolved.    MRI Brain shows acute left frontal infarct that is probably related to atrial fibrillation.  His blood pressure is elevated, mainly 140-150's but he's only on Valsartan portion of his home antihypertensive medication.  No headache.    Plan:  1) Secondary stroke prevention -   a) anticoagulant - Eliquis 5mg BID  b) statin - Atorvastatin 40mg dose given HIV meds    2) Stroke risk factors -   a) Atrial fibrillation - Eliquis; holding beta blocker since HR in 50's  b) HTN - adding HCTZ 12.5mg to regimen for better control.  He'll probably need full home medications within the next few days  c) Hyperlipidemia - -on statin  d) Prediabetes - Hemoglobin A1C 6.0%    3) Stroke workup -   a) STACI today    4) DVT prophylaxis - Eliquis and SCDs    5) Dispo - outpatient OT/no PT needs    Answered patient's questions.

## 2019-11-25 NOTE — DISCHARGE NOTE PROVIDER - NSDCMRMEDTOKEN_GEN_ALL_CORE_FT
apixaban 5 mg oral tablet: 1 tab(s) orally every 12 hours  atorvastatin 40 mg oral tablet: 1 tab(s) orally once a day (at bedtime)  Biktarvy oral tablet: 1 tab(s) orally once a day  bisoprolol 10 mg oral tablet: 1 tab(s) orally once a day  buPROPion 100 mg oral tablet: 1 tab(s) orally once a day  citalopram 20 mg oral tablet: 1 tab(s) orally once a day  valACYclovir 500 mg oral tablet: 1 tab(s) orally once a day  valsartan-hydrochlorothiazide 320mg-25mg oral tablet: 1 tab(s) orally once a day

## 2019-11-25 NOTE — PROGRESS NOTE ADULT - PROBLEM SELECTOR PLAN 2
dc'ed Aspirin & Plavix  -Eliquis 5 BID    #Prediabetes  -F/u outpatient  -Consider Metformin    #Overweight BMI  BMI >25  -Outpatient follow up
children

## 2019-11-25 NOTE — DISCHARGE NOTE PROVIDER - NSDCFUADDAPPT_GEN_ALL_CORE_FT
Please call [facility] to schedule a follow up appointment 2 weeks after your discharge date. Please follow up with your PCP at your earliest convenience.

## 2019-11-25 NOTE — DISCHARGE NOTE PROVIDER - NSDCCPCAREPLAN_GEN_ALL_CORE_FT
PRINCIPAL DISCHARGE DIAGNOSIS  Diagnosis: Acute thromboembolic cerebrovascular accident  Assessment and Plan of Treatment: You had a stroke which caused the right hand weakness that brought you to the hospital. Your stroke was likely caused by an irregular rhythm in your heart. This irregular rhythm is called atrial fibrilation. Atrial fibrilation can cause blood clots to form in your heart which can then travel to your brain. You were started on a blood thinner, Eliquis (apixaban) to prevent these clots from forming. High blood pressure may also contribute to strokes, so it is important to follow up with your primary care doctor to keep it under control.      SECONDARY DISCHARGE DIAGNOSES  Diagnosis: Hypercalcemia  Assessment and Plan of Treatment: You were found to have elevated calcium on your blood tests. Please follow up your primary care provider regarding work-up of the high calcium. A PTH level may be a good blood test to get, please follow up with your PCP and discuss this with him/her.    Diagnosis: Paroxysmal A-fib  Assessment and Plan of Treatment: You were found to have atrial fibrillation. You have an irregular heart rhythm which can predispose you to strokes and other cardiac issues. As long as you continue to take your blood thinner, eliquis, you have less risk of stroke. As long as you keep your heart rate controlled, you have less risk of damage to your heart. Your beta blocker blood pressure medication should help with your heart rate.    Diagnosis: Prediabetes  Assessment and Plan of Treatment: Based on your blood tests, you have pre-diabetes. Your primary care physician can recommend lifestyle changes to prevent the development of diabetes and lower your risk of complications.    Diagnosis: Overweight (BMI 25.0-29.9)  Assessment and Plan of Treatment: According to your height and weight, you are overweight. This can increse your risk for multiple metabolic disorders. Please discuss weight management strategies with your primary care doctor.    Diagnosis: CKD (chronic kidney disease), stage II  Assessment and Plan of Treatment: According to your GFR, a measure of kidney function, you have a slight decline in your kidney function. Please keep track of your kidney function with your doctor to see if this changes over time. Controlling your high blood pressure is a first step to preventing this from progressing.    Diagnosis: Depression, unspecified depression type  Assessment and Plan of Treatment: You have depression and anxiety, please continue taking your home medications.    Diagnosis: Bradycardia, sinus  Assessment and Plan of Treatment: You occasionally have a slow heart rate. The rhythm is sinus, meaning it follows the usual path of conduction in the heart. You were asymptomatic during your stay in the hospital.    Diagnosis: Pericardial effusion  Assessment and Plan of Treatment: You had a pericardial effusion, a small amount of fluid around your heart. You had no symptoms from this effusion. Let your primary care provider know.

## 2019-11-25 NOTE — DIETITIAN INITIAL EVALUATION ADULT. - PROBLEM SELECTOR PLAN 3
Known history of hypertension taking Bisoprolol 10mg QD and Valsartan/HCTZ 320mg/25mg QD at home.  -Holding home anti-hypertensives for permissive hypertension up to 200 SBP

## 2019-11-25 NOTE — DIETITIAN INITIAL EVALUATION ADULT. - OTHER INFO
64-year-old male with a past medical history of HTN and HIV, anxiety admitted for CVA, +Stroke Code 11/21. Noted HTN, AFIB, Pericardial effusion, bradycardia, CKD. EF>55%. +BM 11/24. No pain per flow sheets. No edema/pressure ulcers.   Pt on DASHTLC diet, noted EMR order for asp precautions. 64-year-old male with a past medical history of HTN and HIV, anxiety admitted for CVA, +Stroke Code 11/21. Noted HTN, AFIB, Pericardial effusion, bradycardia, CKD. EF>55%. +BM 11/24. No pain per flow sheets. No edema/pressure ulcers.   Pt on DASHTLC diet, noted EMR order for asp precautions. Spoke with RN; pt unavailable at this time. RN unsure how pt eating 2/2 NPO status - prior NPO after midnight EMR order noted, d/c (11/25).   Please see below for nutritions recommendations.

## 2019-11-25 NOTE — DISCHARGE NOTE PROVIDER - CARE PROVIDER_API CALL
Abby Lee (NP; RN)  NP in Family Health  [address] [place] [id number]  Phone: [phone]  Fax: [phone]  Established Patient  Follow Up Time: 2 weeks

## 2019-11-25 NOTE — DIETITIAN INITIAL EVALUATION ADULT. - ENERGY NEEDS
Height: 6 feet 0 inches, Weight (Current): 204 pounds,  pounds +/-10%, %IBW %114, BMI 27.8  IBW used for EER

## 2019-11-25 NOTE — DISCHARGE NOTE PROVIDER - HOSPITAL COURSE
Patient is 63 yo M with past medical history of HIV, HTN, & Anxiety    Presented with right hand weakness, found to have  left high frontal     lobe compatible with acute infarction. In addition, there is an adjacent     foci of restriction in the left centrum semiovale as well as in the left     subinsular region and left occipital lobe. These may represent lacunar     infarctions, possibly the result of emboli., he was found to have atrial fibrillation on EKG.     Problem List/Main Diagnoses (system-based):     Inpatient treatment course: CT with left frontal infarct, started on Atorvastatin 80 & Aspirin and Plavix, patient then noted to be in Afib, and aspirin plavx dc'ed, eliquis started. Echo with small pericardial effusion. Admitted to [facility] team, MRI with  left high frontal lobe compatible with acute infarction & an adjacent foci of restriction in the left centrum semiovale as well as in the left subinsular region and left occipital lobe. Suggestive of thromboembolic disease. Atorvastatin started at 80 mg then decreased to 40 mg, biktarvy started, valtrex started, bupropion started, HCTZ started. Started Eliquis. STACI 11/25 with PFO, no L atrial thrombus.         New medications: Atorvastatin 40, Eliquis 5 mg BID     Labs to be followed outpatient:     Exam to be followed outpatient:

## 2019-11-25 NOTE — DIETITIAN INITIAL EVALUATION ADULT. - DIET TYPE
While PO diet active: Recommend DASH/TLC; Monitor for %PO intake and diet tolerance. Should pt be noted with s/s of issues swallowing, recommend NPO/FEESST.

## 2019-11-25 NOTE — DISCHARGE NOTE PROVIDER - INSTRUCTIONS
You should try to follow the Mediterranean diet as closely as possible. Alternatively, you can follow the DASH diet. There are resources online to help you plan your meals around these diets.

## 2019-11-25 NOTE — DISCHARGE NOTE PROVIDER - NSDCCAREPROVSEEN_GEN_ALL_CORE_FT
[facility] Stroke Service, Team  Judith Pratt (Neurologist)  Shaylee Frank (Neurologist)  Manjinder Holland (Resident)  Carmine Chaudhari (Intern)

## 2019-11-25 NOTE — DIETITIAN INITIAL EVALUATION ADULT. - PROBLEM SELECTOR PLAN 1
Presented with right hand numbness, tingling, and weakness. Stroke code called and NIHSS 2. CT head with left frontal infarct.  Exam with right upper extremity with 4/5 strength , mild sensory deficits(He is able to feel my hand but says it feels different ). Right hand with weak finger flexion and extension . Patient with right eye left sided visual defects. right sided facial droop.   - ASA 325mg po once, plavix 75  and Atorvastatin 40mg po once given in ED  CT Brain: Findings are suspicious for a high left frontal acute infarction. No evidence of acute hemorrhage.  CT Angio: Normal CTA of the extracranial circulation. No evidence of carotid stenosis.  CT Perfusion: There are scattered bilateral areas of elevated T-Max that do not fit a single vascular territory; these findings are of questionable diagnostic value.  - MRI head ordered  -npo for yasmeen in am   - Dysphagia screen  - TSH, Lipids, coags, HgA1c ordered  - permissive HTN with SBP goal 140-220, hold antihypertensives  - continue with  ASA 81mg daily, Plavix 75 mg and Atorvastatin 40mg qhs   - PT/OT consulted  - Neuro checks q4hs

## 2019-11-25 NOTE — CONSULT NOTE ADULT - PROBLEM SELECTOR RECOMMENDATION 9
Imaging as above  -C/w workup/management as per stroke team  -Eliquis  -Statin  -Pending STACI Outpatient management - can consider Metformin as outpatient

## 2019-11-25 NOTE — CONSULT NOTE ADULT - ASSESSMENT
Pt is a 64-year-old male with a past medical history of HTN and HIV who presented with right hand numbness, tingling, and weakness found to have a right frontal infarct on CT, now confirmed on MRI currently on 7[facility] for further workup.

## 2019-11-25 NOTE — DIETITIAN INITIAL EVALUATION ADULT. - ADD RECOMMEND
Monitor Skin, Labs, wts, GI distress, GOC. RD to remain available for additional nutrition interventions as needed.

## 2019-11-25 NOTE — DISCHARGE NOTE NURSING/CASE MANAGEMENT/SOCIAL WORK - PATIENT PORTAL LINK FT
You can access the FollowMyHealth Patient Portal offered by [facility] by registering at the following website: [email or web address]. By joining "[employer]’s FollowMyHealth portal, you will also be able to view your health information using other applications (apps) compatible with our system.

## 2019-11-25 NOTE — CONSULT NOTE ADULT - PROBLEM SELECTOR RECOMMENDATION 3
-Discovered incidentally on echo (details above)  -Pending STACI  -Unclear etiology but can follow up outpatient with PMD/cardiology

## 2019-11-25 NOTE — CONSULT NOTE ADULT - SUBJECTIVE AND OBJECTIVE BOX
HPI:  Brief Hospital Course  Pt is a 64-year-old male with a past medical history of HTN and HIV, anxiety,  who presented with right hand numbnesson 2019. Upon arrival to [facility] stroke code called . NIHH 2 upon arrival for right arm drift with mild sensory loss. Patient on exam with RUE sensory and motor deficits. Mild right sided facial droop notable . CT Brain: Findings are suspicious for a high left frontal acute infarction. No evidence of acute hemorrhage. CT Angio: Normal CTA of the extracranial circulation. No evidence of carotid stenosis. CT Perfusion: There are scattered bilateral areas of elevated T-Max that do not fit a single vascular territory; these findings are of questionable diagnostic value. Patient admitted to [facility] for r/o stroke.  MRI has confirmed stroke (detailed report below)    Subjective:      Allergies    No Known Allergies    Intolerances    Home medications: Citalopram, Valacyclovir    MEDICATIONS  (STANDING):  apixaban 5 milliGRAM(s) Oral every 12 hours  atorvastatin 80 milliGRAM(s) Oral at bedtime  bictegravir 50 mG/emtricitabine 200 mG/tenofovir alafenamide 25 mG (BIKTARVY) 1 Tablet(s) Oral daily  buPROPion  milliGRAM(s) Oral daily  citalopram 20 milliGRAM(s) Oral daily  valACYclovir 500 milliGRAM(s) Oral daily  valsartan 320 milliGRAM(s) Oral daily    MEDICATIONS  (PRN):    Drug Dosing Weight  Height (cm): 182.9 (2019 20:37)  Weight (kg): 92.9 (2019 20:37)  BMI (kg/m2): 27.8 (2019 20:37)  BSA (m2): 2.15 (2019 20:37)    PAST MEDICAL & SURGICAL HISTORY:  HTN (hypertension)  HIV (human immunodeficiency virus infection)  No significant past surgical history    FAMILY HISTORY:      SOCIAL HISTORY:    Vital Signs Last 24 Hrs  T(C): 36.7 (2019 05:18), Max: 37.1 (2019 17:31)  T(F): 98.1 (2019 05:18), Max: 98.8 (2019 21:18)  HR: 58 (2019 04:20) (50 - 62)  BP: 147/73 (2019 04:20) (139/83 - 148/85)  BP(mean): 99 (2019 04:20) (93 - 103)  ABP: --  ABP(mean): --  RR: 15 (2019 04:20) (15 - 16)  SpO2: 96% (2019 04:20) (93% - 98%)    I&O's Detail      PHYSICAL EXAM:      Constitutional:    Eyes:    ENMT:    Neck:    Breasts:    Back:    Respiratory:    Cardiovascular:    Gastrointestinal:    Genitourinary:    Rectal:    Extremities:    Vascular:    Neurological:    Skin:    Lymph Nodes:    Musculoskeletal:    Psychiatric:        LABS:  CBC Full  -  ( 2019 07:08 )  WBC Count : 8.38 K/uL  RBC Count : 4.62 M/uL  Hemoglobin : 15.0 g/dL  Hematocrit : 45.4 %  Platelet Count - Automated : 156 K/uL  Mean Cell Volume : 98.3 fl  Mean Cell Hemoglobin : 32.5 pg  Mean Cell Hemoglobin Concentration : 33.0 gm/dL  Auto Neutrophil # : x  Auto Lymphocyte # : x  Auto Monocyte # : x  Auto Eosinophil # : x  Auto Basophil # : x  Auto Neutrophil % : x  Auto Lymphocyte % : x  Auto Monocyte % : x  Auto Eosinophil % : x  Auto Basophil % : x    11-25    139  |  107  |  14  ----------------------------<  126<H>  4.0   |  25  |  1.21    Ca    9.9      2019 07:08  Phos  1.9     11-25  Mg     2.1     11-25      CAPILLARY BLOOD GLUCOSE      EK2019:  Fib vs flutter    ECHO, US:  TTE:   1. Mild aortic regurgitation.   2. Normal left and right ventricular size and systolic function.   3. Small pericardial effusion without evidence of cardiac tamponade.    RADIOLOGY:  MRI brain:  Acute infarctions lie within the left MCA territory, as detailed below, scattered and consistent with embolic disease. Left motor strip involvement could explain a right-sided weakness. No hemorrhage on susceptibility-weighted images. T2-FLAIR shows moderate small vessel ischemic change in periventricular and subcortical cerebral white matter. In addition, there is a 1.2 x 0.5 cm mass along the right high interhemispheric fissure with diffusion imaging. Appearance is most consistent with meningioma, a small incidental discovery.

## 2019-11-29 DIAGNOSIS — F32.9 MAJOR DEPRESSIVE DISORDER, SINGLE EPISODE, UNSPECIFIED: ICD-10-CM

## 2019-11-29 DIAGNOSIS — F41.9 ANXIETY DISORDER, UNSPECIFIED: ICD-10-CM

## 2019-11-29 DIAGNOSIS — G83.11 MONOPLEGIA OF LOWER LIMB AFFECTING RIGHT DOMINANT SIDE: ICD-10-CM

## 2019-11-29 DIAGNOSIS — R00.1 BRADYCARDIA, UNSPECIFIED: ICD-10-CM

## 2019-11-29 DIAGNOSIS — E66.3 OVERWEIGHT: ICD-10-CM

## 2019-11-29 DIAGNOSIS — E83.52 HYPERCALCEMIA: ICD-10-CM

## 2019-11-29 DIAGNOSIS — I48.0 PAROXYSMAL ATRIAL FIBRILLATION: ICD-10-CM

## 2019-11-29 DIAGNOSIS — I63.49 CEREBRAL INFARCTION DUE TO EMBOLISM OF OTHER CEREBRAL ARTERY: ICD-10-CM

## 2019-11-29 DIAGNOSIS — R29.702 NIHSS SCORE 2: ICD-10-CM

## 2019-11-29 DIAGNOSIS — N18.3 CHRONIC KIDNEY DISEASE, STAGE 3 (MODERATE): ICD-10-CM

## 2019-11-29 DIAGNOSIS — I10 ESSENTIAL (PRIMARY) HYPERTENSION: ICD-10-CM

## 2019-11-29 DIAGNOSIS — I12.9 HYPERTENSIVE CHRONIC KIDNEY DISEASE WITH STAGE 1 THROUGH STAGE 4 CHRONIC KIDNEY DISEASE, OR UNSPECIFIED CHRONIC KIDNEY DISEASE: ICD-10-CM

## 2019-11-29 DIAGNOSIS — R29.810 FACIAL WEAKNESS: ICD-10-CM

## 2019-11-29 DIAGNOSIS — R73.03 PREDIABETES: ICD-10-CM

## 2019-11-29 DIAGNOSIS — I31.3 PERICARDIAL EFFUSION (NONINFLAMMATORY): ICD-10-CM

## 2019-11-29 DIAGNOSIS — Z21 ASYMPTOMATIC HUMAN IMMUNODEFICIENCY VIRUS [HIV] INFECTION STATUS: ICD-10-CM

## 2019-12-02 PROBLEM — I10 ESSENTIAL (PRIMARY) HYPERTENSION: Chronic | Status: ACTIVE | Noted: 2019-11-21

## 2019-12-02 PROBLEM — Z00.00 ENCOUNTER FOR PREVENTIVE HEALTH EXAMINATION: Status: ACTIVE | Noted: 2019-12-02

## 2019-12-02 PROBLEM — B20 HUMAN IMMUNODEFICIENCY VIRUS [HIV] DISEASE: Chronic | Status: ACTIVE | Noted: 2019-11-21

## 2019-12-03 ENCOUNTER — APPOINTMENT (OUTPATIENT)
Dept: HEART AND VASCULAR | Facility: CLINIC | Age: 64
End: 2019-12-03
Payer: COMMERCIAL

## 2019-12-03 ENCOUNTER — NON-APPOINTMENT (OUTPATIENT)
Age: 64
End: 2019-12-03

## 2019-12-03 VITALS
HEART RATE: 58 BPM | SYSTOLIC BLOOD PRESSURE: 135 MMHG | WEIGHT: 201 LBS | BODY MASS INDEX: 27.22 KG/M2 | DIASTOLIC BLOOD PRESSURE: 67 MMHG | OXYGEN SATURATION: 97 % | HEIGHT: 72 IN

## 2019-12-03 DIAGNOSIS — B20 HUMAN IMMUNODEFICIENCY VIRUS [HIV] DISEASE: ICD-10-CM

## 2019-12-03 DIAGNOSIS — Z86.59 PERSONAL HISTORY OF OTHER MENTAL AND BEHAVIORAL DISORDERS: ICD-10-CM

## 2019-12-03 PROCEDURE — 99204 OFFICE O/P NEW MOD 45 MIN: CPT

## 2019-12-03 PROCEDURE — 93000 ELECTROCARDIOGRAM COMPLETE: CPT

## 2019-12-03 PROCEDURE — 99214 OFFICE O/P EST MOD 30 MIN: CPT

## 2019-12-03 RX ORDER — APIXABAN 5 MG/1
5 TABLET, FILM COATED ORAL
Refills: 0 | Status: COMPLETED | COMMUNITY
End: 2019-12-03

## 2019-12-03 RX ORDER — BICTEGRAVIR SODIUM, EMTRICITABINE, AND TENOFOVIR ALAFENAMIDE FUMARATE 30; 120; 15 MG/1; MG/1; MG/1
TABLET ORAL
Refills: 0 | Status: ACTIVE | COMMUNITY

## 2019-12-03 RX ORDER — BUPROPION HYDROCHLORIDE 100 MG/1
100 TABLET, FILM COATED ORAL
Refills: 0 | Status: ACTIVE | COMMUNITY

## 2019-12-03 NOTE — REASON FOR VISIT
[Initial Evaluation] : an initial evaluation of [Atrial Fibrillation] : atrial fibrillation [FreeTextEntry1] : 65 yo M with past medical history of HIV, HTN, & Anxiety who presented with right hand weakness, found to have left high frontal lobe compatible with acute infarction. In addition, there is an adjacent foci of restriction in the left centrum semiovale as well as in the left subinsular region and left occipital lobe. These may represent lacunar infarctions, possibly the result of emboli., he was found to have atrial\par fibrillation on EKG. His in-patient treatment course: CT with left frontal infarct, started on Atorvastatin 80 & Aspirin and Plavix, patient then noted to be in Afib, and Aspirin plavx dc'ed, eliquis started. Echo with small pericardial effusion. Admitted to [facility], MRI with left high frontal lobe compatible with acute infarction & an adjacent foci of restriction in the left um semiovale as well as in the left subinsular region and left occipital lobe. Suggestive of thromboembolic disease. Atorvastatin started at 80 mg then decreased to 40 mg, biktarvy started, valtrex started, bupropion started, HCTZ started. Started Eliquis. STACI 11/25 with PFO, no L atrial thrombus.

## 2019-12-03 NOTE — DISCUSSION/SUMMARY
[FreeTextEntry1] : CVA check carotid\par HLD MAHIN and I discussed his lipid panel and individualized target LDL goal. At this point, will do diet and exercise with anticipation of re-evaluating labs in 3-6 months\par HTN - MAHIN  and I had an extensive discussion regarding his blood pressure management. Patient will continue taking current medications in addition to maintaining a low Na diet, with periodic b/p checks at home. Will re-check next visit \par AF cont anticoagulation xarelto seems to be approved so will make a change

## 2019-12-03 NOTE — PHYSICAL EXAM
[General Appearance - Well Developed] : well developed [Normal Appearance] : normal appearance [Well Groomed] : well groomed [General Appearance - Well Nourished] : well nourished [No Deformities] : no deformities [General Appearance - In No Acute Distress] : no acute distress [Normal Conjunctiva] : the conjunctiva exhibited no abnormalities [Eyelids - No Xanthelasma] : the eyelids demonstrated no xanthelasmas [Normal Oral Mucosa] : normal oral mucosa [No Oral Pallor] : no oral pallor [No Oral Cyanosis] : no oral cyanosis [Heart Rate And Rhythm] : heart rate and rhythm were normal [Heart Sounds] : normal S1 and S2 [Murmurs] : no murmurs present [Respiration, Rhythm And Depth] : normal respiratory rhythm and effort [Exaggerated Use Of Accessory Muscles For Inspiration] : no accessory muscle use [Auscultation Breath Sounds / Voice Sounds] : lungs were clear to auscultation bilaterally [Abdomen Soft] : soft [Abdomen Tenderness] : non-tender [Abnormal Walk] : normal gait [Abdomen Mass (___ Cm)] : no abdominal mass palpated [Gait - Sufficient For Exercise Testing] : the gait was sufficient for exercise testing [Nail Clubbing] : no clubbing of the fingernails [Cyanosis, Localized] : no localized cyanosis [Petechial Hemorrhages (___cm)] : no petechial hemorrhages [Skin Color & Pigmentation] : normal skin color and pigmentation [] : no rash [No Venous Stasis] : no venous stasis [Skin Lesions] : no skin lesions [No Skin Ulcers] : no skin ulcer [No Xanthoma] : no  xanthoma was observed [Oriented To Time, Place, And Person] : oriented to person, place, and time [Affect] : the affect was normal [Mood] : the mood was normal [No Anxiety] : not feeling anxious

## 2019-12-10 ENCOUNTER — TRANSCRIPTION ENCOUNTER (OUTPATIENT)
Age: 64
End: 2019-12-10

## 2019-12-16 NOTE — CONSULT NOTE ADULT - CONSULT REQUESTED BY NAME
Dr. Frank Labor Progress Note    Tanja Michaels   40w4d      Subjective:  Pt with mild contractions with epidural.   Uterine contractions:yes  Pain: Yes. Severity: mild    Objective:   Vitals:    19 0430 19 0500 19 0520 19 0535   BP: 133/85 120/67 117/66 110/64   Pulse: 99 93 92 82   Resp:       Temp: 36.8 °C (98.3 °F) 37.2 °C (98.9 °F)     TempSrc: Temporal Temporal     SpO2:       Weight:       Height:         Fetal heart variability: 120's category 1  [place]: q 1-2  SVE: 8/90/+1 with thick anterior lip    Membranes ruptured: .yes    Meds:   Epidural : .yes  Pitocin: .yes, 5 mu    Labs:  Recent Results (from the past 24 hour(s))   Hold Blood Bank Specimen (Not Tested)    Collection Time: 12/15/19  9:54 PM   Result Value Ref Range    Holding Tube - Bb DONE    CBC WITH DIFFERENTIAL    Collection Time: 12/15/19  9:54 PM   Result Value Ref Range    WBC 12.2 (H) 4.8 - 10.8 K/uL    RBC 4.83 4.20 - 5.40 M/uL    Hemoglobin 15.7 12.0 - 16.0 g/dL    Hematocrit 46.8 37.0 - 47.0 %    MCV 96.9 81.4 - 97.8 fL    MCH 32.5 27.0 - 33.0 pg    MCHC 33.5 (L) 33.6 - 35.0 g/dL    RDW 41.9 35.9 - 50.0 fL    Platelet Count 286 164 - 446 K/uL    MPV 10.2 9.0 - 12.9 fL    Neutrophils-Polys 74.80 (H) 44.00 - 72.00 %    Lymphocytes 15.10 (L) 22.00 - 41.00 %    Monocytes 8.90 0.00 - 13.40 %    Eosinophils 0.30 0.00 - 6.90 %    Basophils 0.20 0.00 - 1.80 %    Immature Granulocytes 0.70 0.00 - 0.90 %    Nucleated RBC 0.00 /100 WBC    Neutrophils (Absolute) 9.11 (H) 2.00 - 7.15 K/uL    Lymphs (Absolute) 1.84 1.00 - 4.80 K/uL    Monos (Absolute) 1.08 (H) 0.00 - 0.85 K/uL    Eos (Absolute) 0.04 0.00 - 0.51 K/uL    Baso (Absolute) 0.02 0.00 - 0.12 K/uL    Immature Granulocytes (abs) 0.08 0.00 - 0.11 K/uL    NRBC (Absolute) 0.00 K/uL       Assessment:   40w4d/active labor- progressing, CPM, expt .  GBBS-negative  Fetal status-reassuring    Jenifer Sung

## 2019-12-30 ENCOUNTER — APPOINTMENT (OUTPATIENT)
Dept: HEART AND VASCULAR | Facility: CLINIC | Age: 64
End: 2019-12-30

## 2020-01-06 ENCOUNTER — APPOINTMENT (OUTPATIENT)
Dept: HEART AND VASCULAR | Facility: CLINIC | Age: 65
End: 2020-01-06
Payer: COMMERCIAL

## 2020-01-06 PROCEDURE — 93880 EXTRACRANIAL BILAT STUDY: CPT

## 2020-01-06 PROCEDURE — 99214 OFFICE O/P EST MOD 30 MIN: CPT

## 2020-01-07 NOTE — REASON FOR VISIT
[Follow-Up - Clinic] : a clinic follow-up of [Atrial Fibrillation] : atrial fibrillation [FreeTextEntry1] : 65 yo M with past medical history of HIV, HTN, & Anxiety who presented with right hand weakness, found to have left high frontal lobe compatible with acute infarction. In addition, there is an adjacent foci of restriction in the left centrum semiovale as well as in the left subinsular region and left occipital lobe. These may represent lacunar infarctions, possibly the result of emboli., he was found to have atrial\par fibrillation on EKG. His in-patient treatment course: CT with left frontal infarct, started on Atorvastatin 80 & Aspirin and Plavix, patient then noted to be in Afib, and Aspirin plavx dc'ed, eliquis started. Echo with small pericardial effusion. Admitted to [facility] team, MRI with left high frontal lobe compatible with acute infarction & an adjacent foci of restriction in the left um semiovale as well as in the left subinsular region and left occipital lobe. Suggestive of thromboembolic disease. Atorvastatin started at 80 mg then decreased to 40 mg, biktarvy started, valtrex started, bupropion started, HCTZ started. Started Eliquis. STACI 11/25 with PFO, no L atrial thrombus. Completed a cartotid u/s. No issues with medications.

## 2020-01-07 NOTE — PHYSICAL EXAM
[General Appearance - Well Developed] : well developed [Normal Appearance] : normal appearance [Well Groomed] : well groomed [General Appearance - Well Nourished] : well nourished [No Deformities] : no deformities [General Appearance - In No Acute Distress] : no acute distress [Normal Conjunctiva] : the conjunctiva exhibited no abnormalities [Eyelids - No Xanthelasma] : the eyelids demonstrated no xanthelasmas [Normal Oral Mucosa] : normal oral mucosa [No Oral Pallor] : no oral pallor [No Oral Cyanosis] : no oral cyanosis [Respiration, Rhythm And Depth] : normal respiratory rhythm and effort [Exaggerated Use Of Accessory Muscles For Inspiration] : no accessory muscle use [Auscultation Breath Sounds / Voice Sounds] : lungs were clear to auscultation bilaterally [Heart Rate And Rhythm] : heart rate and rhythm were normal [Heart Sounds] : normal S1 and S2 [Murmurs] : no murmurs present [Abdomen Soft] : soft [Abdomen Tenderness] : non-tender [Abdomen Mass (___ Cm)] : no abdominal mass palpated [Abnormal Walk] : normal gait [Gait - Sufficient For Exercise Testing] : the gait was sufficient for exercise testing [Nail Clubbing] : no clubbing of the fingernails [Cyanosis, Localized] : no localized cyanosis [Petechial Hemorrhages (___cm)] : no petechial hemorrhages [Skin Color & Pigmentation] : normal skin color and pigmentation [] : no rash [No Venous Stasis] : no venous stasis [Skin Lesions] : no skin lesions [No Skin Ulcers] : no skin ulcer [No Xanthoma] : no  xanthoma was observed [Oriented To Time, Place, And Person] : oriented to person, place, and time [Affect] : the affect was normal [No Anxiety] : not feeling anxious [Mood] : the mood was normal

## 2020-02-05 ENCOUNTER — TRANSCRIPTION ENCOUNTER (OUTPATIENT)
Age: 65
End: 2020-02-05

## 2020-04-13 ENCOUNTER — APPOINTMENT (OUTPATIENT)
Dept: HEART AND VASCULAR | Facility: CLINIC | Age: 65
End: 2020-04-13

## 2020-07-14 NOTE — OCCUPATIONAL THERAPY INITIAL EVALUATION ADULT - SITTING BALANCE: DYNAMIC
"INFECTIOUS DISEASES PROGRESS NOTE    Patient:  Sunny Lacey  YOB: 1922  MRN: [id number]   Admit date: 7/8/2020   Admitting Physician: Phong Joseph MD  Primary Care Physician: Nevaeh Thornton PA    Chief Complaint: \"what are we doing?\"        Interval History:  Patient    Allergies: No Known Allergies    Current Meds:     Current Facility-Administered Medications:   •  acetaminophen (TYLENOL) tablet 650 mg, 650 mg, Oral, BID, Phong Joseph MD, 650 mg at 07/13/20 0900  •  acetaminophen (TYLENOL) tablet 650 mg, 650 mg, Oral, Q6H PRN, Phong Joseph MD  •  albuterol (PROVENTIL) nebulizer solution 0.083% 2.5 mg/3mL, 2.5 mg, Nebulization, Q6H PRN, Phong Joseph MD  •  ALPRAZolam (XANAX) tablet 0.25 mg, 0.25 mg, Oral, Daily PRN, Phong Joseph MD  •  ALPRAZolam (XANAX) tablet 0.25 mg, 0.25 mg, Oral, Nightly, Phong Joseph MD  •  amLODIPine (NORVASC) tablet 5 mg, 5 mg, Oral, Daily, Phong Joseph MD, 5 mg at 07/13/20 0900  •  brimonidine-timolol (COMBIGAN) 0.2-0.5 % ophthalmic solution 1 drop, 1 drop, Right Eye, Q12H, Phong Joseph MD, 1 drop at 07/13/20 0900  •  citalopram (CeleXA) tablet 20 mg, 20 mg, Oral, Daily, Phong Joseph MD, 20 mg at 07/13/20 0900  •  dextrose 5 % and sodium chloride 0.45 % with KCl 20 mEq/L infusion, 50 mL/hr, Intravenous, Continuous, Phong Joseph MD, Last Rate: 50 mL/hr at 07/13/20 1211, 50 mL/hr at 07/13/20 1211  •  diclofenac (VOLTAREN) 1 % gel 4 g, 4 g, Topical, 4x Daily, Phong Joseph MD, 4 g at 07/13/20 1740  •  docusate sodium (COLACE) capsule 100 mg, 100 mg, Oral, Daily, Phong Joesph MD, 100 mg at 07/12/20 0902  •  enoxaparin (LOVENOX) syringe 40 mg, 40 mg, Subcutaneous, Q24H, Phong Joseph MD, 40 mg at 07/13/20 1740  •  ferrous sulfate tablet 325 mg, 325 mg, Oral, Daily With Breakfast, Phong Joseph MD, 325 mg at 07/13/20 0900  •  glycerin " "(ADULT) rectal suppository 2 g, 2 g, Rectal, Daily PRN, Phong Joseph MD, 2 g at 07/10/20 1128  •  latanoprost (XALATAN) 0.005 % ophthalmic solution 1 drop, 1 drop, Both Eyes, Nightly, Phong Joseph MD, 1 drop at 20  •  lidocaine (LMX) 4 % cream, , Topical, 4x Daily PRN, Phong Joseph MD  •  ondansetron (ZOFRAN) injection 4 mg, 4 mg, Intravenous, Q6H PRN, Phong Joseph MD, 4 mg at 20 1654  •  Pharmacy Consult - Pharmacy to dose, , Does not apply, Continuous PRN, Phong Joseph MD  •  Pharmacy to Dose enoxaparin (LOVENOX), , Does not apply, Continuous PRN, Phong Joseph MD  •  piperacillin-tazobactam (ZOSYN) 3.375 g in iso-osmotic dextrose 50 ml (premix), 3.375 g, Intravenous, Q8H, Phong Joseph MD, Last Rate: 0 mL/hr at 20 0639, 3.375 g at 20 1114  •  potassium chloride (MICRO-K) CR capsule 20 mEq, 20 mEq, Oral, BID With Meals, Oliver Starkey MD, 20 mEq at 20 1740  •  sodium chloride 0.9 % bolus 1,464 mL, 30 mL/kg, Intravenous, PRN, Phong Joseph MD  •  sodium chloride 0.9 % flush 10 mL, 10 mL, Intravenous, Q12H, Phong Joseph MD, 10 mL at 20 0859  •  sodium chloride 0.9 % flush 10 mL, 10 mL, Intravenous, PRN, Phong Joseph MD      Review of Systems   Constitutional: Negative for fever.   Respiratory: Negative for shortness of breath.        Objective     Vital Signs:  Temp (24hrs), Av °F (36.7 °C), Min:97.6 °F (36.4 °C), Max:98.6 °F (37 °C)      /87 (BP Location: Right arm, Patient Position: Lying)   Pulse 83   Temp 97.9 °F (36.6 °C) (Axillary)   Resp 20   Ht 170.2 cm (67\")   Wt 76.7 kg (169 lb 3 oz)   SpO2 97%   BMI 26.50 kg/m²         Physical Exam   General: The patient is a 89-year-old female appearing better than her stated age sitting up in bed in no acute distress  HEENT: Sclera anicteric and noninjected  Respiratory: Effort even and unlabored  Abdomen: " Soft, nontender, nondistended  Psych: She is pleasant and cooperative  Neuro: She is alert.  She is slow to answer questions.  Her speech is clear.    Results Review:    I reviewed the patient's new clinical results.    Lab Results:  CBC:   Lab Results   Lab 07/08/20 0445 07/09/20  0557 07/10/20  0448 07/11/20  0500 07/12/20  0248 07/13/20  0403   WBC 14.94* 11.03* 9.00 6.82 7.39 8.51   HEMOGLOBIN 11.9* 10.8* 10.6* 10.6* 11.8* 13.6   HEMATOCRIT 37.5 34.6 33.0* 32.8* 36.1 41.5   PLATELETS 90* 85* 103* 111* 129* 168   LYMPHOCYTE % 3.0*  --   --   --   --   --    MONOCYTES % 5.0  --   --   --   --   --          CMP:   Lab Results   Lab 07/11/20  0500 07/12/20 0248 07/13/20  0403   SODIUM 147* 145 145   POTASSIUM 3.4* 2.8* 3.0*   CHLORIDE 106 104 106   CO2 30.0* 32.0* 28.0   BUN 8 6* 8   CREATININE 0.67 0.53* 0.66   CALCIUM 9.1 9.1 9.5   GLUCOSE 108* 106* 133*         Culture Results:    Blood Culture   Date Value Ref Range Status   07/08/2020 No growth at 5 days  Final   07/08/2020 No growth at 5 days  Final       Microbiology Results Abnormal     Procedure Component Value - Date/Time    Blood Culture - Blood, Arm, Right [[id number]] Collected:  07/08/20 0445    Lab Status:  Final result Specimen:  Blood from Arm, Right Updated:  07/13/20 0500     Blood Culture No growth at 5 days    Blood Culture - Blood, Hand, Right [[id number]] Collected:  07/08/20 0445    Lab Status:  Final result Specimen:  Blood from Hand, Right Updated:  07/13/20 0500     Blood Culture No growth at 5 days    Respiratory Panel, PCR - Swab, Nasopharynx [[id number]]  (Normal) Collected:  07/08/20 0505    Lab Status:  Final result Specimen:  Swab from Nasopharynx Updated:  07/08/20 0652     ADENOVIRUS, PCR Not Detected     Coronavirus 229E Not Detected     Coronavirus HKU1 Not Detected     Coronavirus NL63 Not Detected     Coronavirus OC43 Not Detected     Human Metapneumovirus Not Detected     Human Rhinovirus/Enterovirus Not Detected     Influenza  B PCR Not Detected     Parainfluenza Virus 1 Not Detected     Parainfluenza Virus 2 Not Detected     Parainfluenza Virus 3 Not Detected     Parainfluenza Virus 4 Not Detected     Bordetella pertussis pcr Not Detected     Influenza A H1 2009 PCR Not Detected     Chlamydophila pneumoniae PCR Not Detected     Mycoplasma pneumo by PCR Not Detected     Influenza A PCR Not Detected     Influenza A H3 Not Detected     Influenza A H1 Not Detected     RSV, PCR Not Detected     Bordetella parapertussis PCR Not Detected    Narrative:       The coronavirus on the RVP is NOT COVID-19 and is NOT indicative of infection with COVID-19.     COVID PRE-OP / PRE-PROCEDURE SCREENING ORDER (NO ISOLATION) - Swab, Nasopharynx [[id number]] Collected:  07/08/20 0055    Lab Status:  Final result Specimen:  Swab from Nasopharynx Updated:  07/08/20 0154    Narrative:       The following orders were created for panel order COVID PRE-OP / PRE-PROCEDURE SCREENING ORDER (NO ISOLATION) - Swab, Nasopharynx.  Procedure                               Abnormality         Status                     ---------                               -----------         ------                     COVID-19,CEPHEID,COR/LUIS/...[[id number]]  Normal              Final result                 Please view results for these tests on the individual orders.    COVID-19,CEPHEID,COR/LUIS/PAD IN-HOUSE(OR EMERGENT/ADD-ON),NP SWAB IN TRANSPORT MEDIA 3-4 HR TAT - Swab, Nasopharynx [[id number]]  (Normal) Collected:  07/08/20 0055    Lab Status:  Final result Specimen:  Swab from Nasopharynx Updated:  07/08/20 0154     COVID19 Not Detected    Narrative:       Fact sheet for providers: https://www.fda.gov/media/315768/download     Fact sheet for patients: https://www.fda.gov/media/097973/download                Radiology:   Imaging Results (Last 72 Hours)     Procedure Component Value Units Date/Time    CT Head Without Contrast [[id number]] Collected:  07/12/20 1007     Updated:  07/12/20 1012     Narrative:       EXAMINATION:   CT HEAD WO CONTRAST-  7/12/2020 10:07 AM CDT     HISTORY: CT BRAIN without contrast 7/12/2020 9:46 AM CDT     HISTORY: Neuro deficit     COMPARISON: None      DLP: 688 mGy cm     TECHNIQUE: Serial axial tomographic images of the brain were obtained  without the use of intravenous contrast.      FINDINGS:   The midline structures are nondisplaced. There is mild cerebral and  cerebellar volume loss, with an associated increase in the prominence of  the ventricles and sulci. The basilar cisterns are normal in size and  configuration. There is no evidence of intracranial hemorrhage or  mass-effect. There is low attenuation in the periventricular white  matter, consistent with chronic ischemic change.     Old lacunar infarctions present right basal ganglia region.     There are no abnormal extra-axial fluid collections. There is no  evidence of tonsillar herniation.      The included orbits and their contents are unremarkable. The visualized  paranasal sinuses, mastoid air cells and middle ear cavities are clear.  The visualized osseous structures and overlying soft tissues of the  skull and face are intact.        Impression:       Mild cerebral and cerebellar volume loss with chronic microvascular  disease but no evidence of acute intracranial process.        This report was finalized on 07/12/2020 10:09 by Dr. Dimas Arcos MD.          Assessment/Plan     Active Hospital Problems    Diagnosis   • At risk for falls   • Hypokalemia   • Sepsis (CMS/HCC)   • Pneumonia due to infectious organism       IMPRESSION:  1. Fever of uncertain etiology.  Pneumonia?   Patient has been on empiric antibiotics for 6 days here at [facility]...  No other source has been found.  Urine and blood cultures from [place] prior to transfer are negative.  2. Leukocytosis-resolved      RECOMMENDATION:   · Discontinue Zosyn  · Monitor off antibiotics          Ligia Dominguez MD  07/13/20  19:02       good balance

## 2020-10-05 NOTE — PROGRESS NOTE ADULT - PROBLEM SELECTOR PLAN 9
I have personally evaluated and examined the patient. The Attending was available to me as a supervising provider if needed.
F: None  E: Replete PRN  N: DASH/TLC  PPX: None  DVT PPX: eliquis 5 q 12  Code: Full  Dispo: 7LA

## 2020-12-08 ENCOUNTER — APPOINTMENT (OUTPATIENT)
Dept: HEART AND VASCULAR | Facility: CLINIC | Age: 65
End: 2020-12-08
Payer: COMMERCIAL

## 2020-12-08 ENCOUNTER — NON-APPOINTMENT (OUTPATIENT)
Age: 65
End: 2020-12-08

## 2020-12-08 VITALS
SYSTOLIC BLOOD PRESSURE: 155 MMHG | WEIGHT: 197.56 LBS | DIASTOLIC BLOOD PRESSURE: 82 MMHG | TEMPERATURE: 98.7 F | OXYGEN SATURATION: 93 % | HEART RATE: 56 BPM | HEIGHT: 72 IN | BODY MASS INDEX: 26.76 KG/M2

## 2020-12-08 PROCEDURE — 93000 ELECTROCARDIOGRAM COMPLETE: CPT

## 2020-12-08 PROCEDURE — 99072 ADDL SUPL MATRL&STAF TM PHE: CPT

## 2020-12-08 PROCEDURE — 99214 OFFICE O/P EST MOD 30 MIN: CPT

## 2020-12-08 NOTE — DISCUSSION/SUMMARY
[FreeTextEntry1] : HTN - MAHIN  and I had an extensive discussion regarding his blood pressure management. Patient will continue taking current medications in addition to maintaining a low Na diet, with periodic b/p checks at home.\par HLD MAHIN and I discussed his lipid panel and individualized target LDL goal. At this point, will do diet and exercise with anticipation of re-evaluating labs in 3-6 months\par AF cont xarelto, echo and carotid provided his insurance company feels that it is a reasonable course of action and deems appropriate to approve.\par Lab from PMD reviewed.

## 2020-12-08 NOTE — REASON FOR VISIT
[Follow-Up - Clinic] : a clinic follow-up of [FreeTextEntry1] : 65 yo M with past medical history of HIV, HTN, & Anxiety who presented with right hand weakness, found to have left high frontal lobe compatible with acute infarction. In addition, there is an adjacent foci of restriction in the left centrum semiovale as well as in the left subinsular region and left occipital lobe. These may represent lacunar infarctions, possibly the result of emboli., he was found to have atrial\par fibrillation on EKG. His in-patient treatment course: CT with left frontal infarct, started on Atorvastatin 80 & Aspirin and Plavix, patient then noted to be in Afib, and Aspirin plavx dc'ed, eliquis started. Echo with small pericardial effusion. Admitted to [facility], MRI with left high frontal lobe compatible with acute infarction & an adjacent foci of restriction in the left um semiovale as well as in the left subinsular region and left occipital lobe. Suggestive of thromboembolic disease. Atorvastatin started at 80 mg then decreased to 40 mg, biktarvy started, valtrex started, bupropion started, HCTZ started. Started Eliquis. STACI 11/25 with PFO, no L atrial thrombus. Completed a cartotid u/s. No issues with medications.

## 2020-12-15 ENCOUNTER — TRANSCRIPTION ENCOUNTER (OUTPATIENT)
Age: 65
End: 2020-12-15

## 2020-12-16 ENCOUNTER — TRANSCRIPTION ENCOUNTER (OUTPATIENT)
Age: 65
End: 2020-12-16

## 2021-01-01 ENCOUNTER — TRANSCRIPTION ENCOUNTER (OUTPATIENT)
Age: 66
End: 2021-01-01

## 2021-01-02 NOTE — PATIENT PROFILE ADULT - BRADEN FRICTION AND SHEAR
(3) no apparent problem
Will give Librium and Ativan for alcohol withdrawal. Probable admission for alcohol withdrawal.

## 2021-01-05 ENCOUNTER — OUTPATIENT (OUTPATIENT)
Dept: OUTPATIENT SERVICES | Facility: HOSPITAL | Age: 66
LOS: 1 days | End: 2021-01-05
Payer: COMMERCIAL

## 2021-01-05 ENCOUNTER — APPOINTMENT (OUTPATIENT)
Dept: DISASTER EMERGENCY | Facility: HOSPITAL | Age: 66
End: 2021-01-05

## 2021-01-05 VITALS
SYSTOLIC BLOOD PRESSURE: 105 MMHG | OXYGEN SATURATION: 99 % | TEMPERATURE: 99 F | HEART RATE: 64 BPM | DIASTOLIC BLOOD PRESSURE: 65 MMHG | RESPIRATION RATE: 18 BRPM

## 2021-01-05 VITALS
TEMPERATURE: 98 F | HEART RATE: 62 BPM | DIASTOLIC BLOOD PRESSURE: 70 MMHG | OXYGEN SATURATION: 96 % | SYSTOLIC BLOOD PRESSURE: 111 MMHG | RESPIRATION RATE: 20 BRPM

## 2021-01-05 DIAGNOSIS — U07.1 COVID-19: ICD-10-CM

## 2021-01-05 PROCEDURE — M0239: CPT

## 2021-01-05 RX ORDER — BAMLANIVIMAB 35 MG/ML
700 INJECTION, SOLUTION INTRAVENOUS ONCE
Refills: 0 | Status: COMPLETED | OUTPATIENT
Start: 2021-01-05 | End: 2021-01-05

## 2021-01-05 RX ADMIN — BAMLANIVIMAB 270 MILLIGRAM(S): 35 INJECTION, SOLUTION INTRAVENOUS at 11:08

## 2021-01-05 NOTE — MONOCLONAL ANTIBODY INFUSION - HOME MEDICATIONS
atorvastatin 40 mg oral tablet , 1 tab(s) orally once a day (at bedtime)  apixaban 5 mg oral tablet , 1 tab(s) orally every 12 hours  buPROPion 100 mg oral tablet , 1 tab(s) orally once a day  valsartan-hydrochlorothiazide 320mg-25mg oral tablet , 1 tab(s) orally once a day  citalopram 20 mg oral tablet , 1 tab(s) orally once a day  valACYclovir 500 mg oral tablet , 1 tab(s) orally once a day  Biktarvy oral tablet , 1 tab(s) orally once a day  bisoprolol 10 mg oral tablet , 1 tab(s) orally once a day

## 2021-01-05 NOTE — MONOCLONAL ANTIBODY INFUSION - ASSESSMENT AND PLAN
ASSESSMENT:  Patient is a 66yo M with a past medical history of HIV+, HTN, h/o CVA, AFib found with + Covid (12/30/20)  referred by Dr. Wasserman who presents to infusion center for Monoclonal antibody infusion (Bamlanivimab)  Symptoms/ Criteria: fevers/chills/sweats, body aches, diarrhea  Risk Profile includes: >66yo, immune suppressive, HTN    PLAN:  - Infusion procedure explained to patient   - Consent for monoclonal antibody infusion obtained and placed in patient's bedside chart  - Risk and benefits discussed with the patient and all questions were answered  - Infuse Bamlanivimab 700mg IV over one hour  - Check vital signs immediately prior to starting infusion and then every 15 minutes while infusion is running   - Observe patient for one hour post infusion      I have reviewed the Bamlanivimab Emergency Use Authorization (EUA) and I have provided the patient or patient's caregiver with the following information:    1. FDA has authorized emergency use Bamlanivimab, which is not an FDA-approved biological product.  2. The patient or patient's caregiver has the option to accept or refuse administration of Bamlanivimab.   3. The significant known and potential risks and benefits of Bamlanivimab and the extent to which such risks and benefits are unknown.  4. Information on available alternative treatments and risks and benefits of those alternatives.    ASSESSMENT:  Patient is a 66yo M with a past medical history of HIV+, HTN, h/o CVA, AFib found with + Covid (12/30/20)  referred by Dr. Wasserman who presents to infusion center for Monoclonal antibody infusion (Bamlanivimab)  Symptoms/ Criteria: fevers/chills/sweats, body aches, diarrhea  Risk Profile includes: >66yo, immune suppressive, HTN    PLAN:  - Infusion procedure explained to patient   - Consent for monoclonal antibody infusion obtained and placed in patient's bedside chart  - Risk and benefits discussed with the patient and all questions were answered  - Infuse Bamlanivimab 700mg IV over one hour  - Check vital signs immediately prior to starting infusion and then every 15 minutes while infusion is running   - Observe patient for one hour post infusion      I have reviewed the Bamlanivimab Emergency Use Authorization (EUA) and I have provided the patient or patient's caregiver with the following information:    1. FDA has authorized emergency use Bamlanivimab, which is not an FDA-approved biological product.  2. The patient or patient's caregiver has the option to accept or refuse administration of Bamlanivimab.   3. The significant known and potential risks and benefits of Bamlanivimab and the extent to which such risks and benefits are unknown.  4. Information on available alternative treatments and risks and benefits of those alternatives.       Addendum:  Patient is cleared for discharge.  He tolerated full infusion well and denies complaints of chest pain, shortness of breath, nausea/vomiting, dizziness, or palpitations. Vital signs stable upon discharge. Patient is medically stable and cleared for discharge home. Discharge instructions provided to patient with fact sheet included. Patient was instructed to continue to self-isolate and use  infection control measures (e.g., wear mask, isolate, social distance, avoid sharing personal items, clean and disinfect “high touch” surfaces, and frequent handwashing) according to CDC guidelines. Patient instructed to follow up with PMD as needed.

## 2021-01-05 NOTE — MONOCLONAL ANTIBODY INFUSION - EXAM
CC: Monoclonal Antibody Infusion/COVID 19 Positive  65yFemale    exam/findings:  T(C): 36.9 (01-05-21 @ 11:22), Max: 36.9 (01-05-21 @ 11:08)  HR: 60 (01-05-21 @ 11:22) (60 - 64)  BP: 104/67 (01-05-21 @ 11:22) (97/62 - 111/70)  RR: 18 (01-05-21 @ 11:22) (18 - 20)  SpO2: 98% (01-05-21 @ 11:22) (96% - 98%)      PE:   Appearance: NAD	,   HEENT:   Normal oral mucosa,   Lymphatic: No lymphadenopathy  Cardiovascular: RRR  Respiratory: Lungs clear to auscultation	  Gastrointestinal:  Soft, Non-tender, + BS	  Skin: warm and dry, no rash  Neurologic: A&O x 3  Extremities: Neg edema

## 2021-01-06 ENCOUNTER — TRANSCRIPTION ENCOUNTER (OUTPATIENT)
Age: 66
End: 2021-01-06

## 2021-01-13 ENCOUNTER — TRANSCRIPTION ENCOUNTER (OUTPATIENT)
Age: 66
End: 2021-01-13

## 2021-01-14 ENCOUNTER — TRANSCRIPTION ENCOUNTER (OUTPATIENT)
Age: 66
End: 2021-01-14

## 2021-02-02 ENCOUNTER — APPOINTMENT (OUTPATIENT)
Dept: HEART AND VASCULAR | Facility: CLINIC | Age: 66
End: 2021-02-02
Payer: COMMERCIAL

## 2021-02-02 VITALS
HEIGHT: 72 IN | DIASTOLIC BLOOD PRESSURE: 75 MMHG | OXYGEN SATURATION: 97 % | TEMPERATURE: 98.7 F | HEART RATE: 61 BPM | BODY MASS INDEX: 26.68 KG/M2 | SYSTOLIC BLOOD PRESSURE: 145 MMHG | WEIGHT: 197 LBS

## 2021-02-02 PROCEDURE — 99072 ADDL SUPL MATRL&STAF TM PHE: CPT

## 2021-02-02 PROCEDURE — 93306 TTE W/DOPPLER COMPLETE: CPT

## 2021-02-02 PROCEDURE — 99214 OFFICE O/P EST MOD 30 MIN: CPT

## 2021-02-02 PROCEDURE — 93880 EXTRACRANIAL BILAT STUDY: CPT

## 2021-02-02 NOTE — PHYSICAL EXAM
[General Appearance - Well Developed] : well developed [Normal Appearance] : normal appearance [Well Groomed] : well groomed [General Appearance - Well Nourished] : well nourished [No Deformities] : no deformities [General Appearance - In No Acute Distress] : no acute distress [Normal Conjunctiva] : the conjunctiva exhibited no abnormalities [Eyelids - No Xanthelasma] : the eyelids demonstrated no xanthelasmas [Normal Oral Mucosa] : normal oral mucosa [No Oral Pallor] : no oral pallor [No Oral Cyanosis] : no oral cyanosis [Exaggerated Use Of Accessory Muscles For Inspiration] : no accessory muscle use [Respiration, Rhythm And Depth] : normal respiratory rhythm and effort [Auscultation Breath Sounds / Voice Sounds] : lungs were clear to auscultation bilaterally [Heart Rate And Rhythm] : heart rate and rhythm were normal [Heart Sounds] : normal S1 and S2 [Murmurs] : no murmurs present [Abdomen Soft] : soft [Abdomen Tenderness] : non-tender [Abdomen Mass (___ Cm)] : no abdominal mass palpated [Abnormal Walk] : normal gait [Gait - Sufficient For Exercise Testing] : the gait was sufficient for exercise testing [Nail Clubbing] : no clubbing of the fingernails [Cyanosis, Localized] : no localized cyanosis [Petechial Hemorrhages (___cm)] : no petechial hemorrhages [Skin Color & Pigmentation] : normal skin color and pigmentation [] : no rash [No Venous Stasis] : no venous stasis [Skin Lesions] : no skin lesions [No Xanthoma] : no  xanthoma was observed [No Skin Ulcers] : no skin ulcer [Oriented To Time, Place, And Person] : oriented to person, place, and time [Affect] : the affect was normal [Mood] : the mood was normal [No Anxiety] : not feeling anxious

## 2021-02-02 NOTE — REASON FOR VISIT
[Follow-Up - Clinic] : a clinic follow-up of [Hyperlipidemia] : hyperlipidemia [Hypertension] : hypertension [FreeTextEntry1] : 63 yo M with past medical history of HIV, HTN, & Anxiety who presented with right hand weakness, found to have left high frontal lobe compatible with acute infarction. In addition, there is an adjacent foci of restriction in the left centrum semiovale as well as in the left subinsular region and left occipital lobe. These may represent lacunar infarctions, possibly the result of emboli., he was found to have atrial\par fibrillation on EKG. His in-patient treatment course: CT with left frontal infarct, started on Atorvastatin 80 & Aspirin and Plavix, patient then noted to be in Afib, and Aspirin plavx dc'ed, eliquis started. Echo with small pericardial effusion. Admitted to [facility] team, MRI with left high frontal lobe compatible with acute infarction & an adjacent foci of restriction in the left um semiovale as well as in the left subinsular region and left occipital lobe. Suggestive of thromboembolic disease. Atorvastatin started at 80 mg then decreased to 40 mg, biktarvy started, valtrex started, bupropion started, HCTZ started. Started Eliquis. STACI 11/25 with PFO, no L atrial thrombus. Completed a cartotid u/s. No issues with medications. He completed an echo and carotid u/s today. We are discussing follow up and further care. He saw his PMD and blood pressure issues are still ongoing.

## 2021-02-02 NOTE — DISCUSSION/SUMMARY
[FreeTextEntry1] : Leg discomfort will move forward with lower extremity arterial doppler and ANASTASIA\par Af continue anticoagulation\par HTN - MAHIN  and I had an extensive discussion regarding his blood pressure management. Patient will continue taking current medications in addition to maintaining a low Na diet, with periodic b/p checks at home.\par HLD MAHIN and I discussed his lipid panel and individualized target LDL goal. At this point, will do diet and exercise with anticipation of re-evaluating labs in 3-6 months\par

## 2021-02-22 ENCOUNTER — RX RENEWAL (OUTPATIENT)
Age: 66
End: 2021-02-22

## 2021-03-02 ENCOUNTER — APPOINTMENT (OUTPATIENT)
Dept: HEART AND VASCULAR | Facility: CLINIC | Age: 66
End: 2021-03-02
Payer: COMMERCIAL

## 2021-03-02 VITALS
SYSTOLIC BLOOD PRESSURE: 124 MMHG | OXYGEN SATURATION: 96 % | BODY MASS INDEX: 26.68 KG/M2 | HEIGHT: 72 IN | DIASTOLIC BLOOD PRESSURE: 74 MMHG | HEART RATE: 63 BPM | WEIGHT: 197 LBS | TEMPERATURE: 97.6 F

## 2021-03-02 DIAGNOSIS — I48.0 PAROXYSMAL ATRIAL FIBRILLATION: ICD-10-CM

## 2021-03-02 PROCEDURE — 99072 ADDL SUPL MATRL&STAF TM PHE: CPT

## 2021-03-02 PROCEDURE — 93922 UPR/L XTREMITY ART 2 LEVELS: CPT

## 2021-03-02 PROCEDURE — 99214 OFFICE O/P EST MOD 30 MIN: CPT

## 2021-03-02 PROCEDURE — 93925 LOWER EXTREMITY STUDY: CPT

## 2021-03-02 NOTE — DISCUSSION/SUMMARY
[FreeTextEntry1] : SANDI STOCKTON and I discussed his lipid panel and individualized target LDL goal. At this point, will do diet and exercise with anticipation of re-evaluating labs in 3-6 months Labs reviewed\par PVD mild disease noted, no intervention at this time\par HTN responding well to meds. We chatted regarding his blood pressure management. Patient will continue taking current medications in addition to maintaining a low Na diet, with periodic b/p checks at home.\par

## 2021-03-02 NOTE — REASON FOR VISIT
[Follow-Up - Clinic] : a clinic follow-up of [Hyperlipidemia] : hyperlipidemia [Hypertension] : hypertension [FreeTextEntry1] : 65 yo M with past medical history of HIV, HTN, & Anxiety who presented with right hand weakness, found to have left high frontal lobe compatible with acute infarction. In addition, there is an adjacent foci of restriction in the left centrum semiovale as well as in the left subinsular region and left occipital lobe. These may represent lacunar infarctions, possibly the result of emboli., he was found to have atrial\par fibrillation on EKG. His in-patient treatment course: CT with left frontal infarct, started on Atorvastatin 80 & Aspirin and Plavix, patient then noted to be in Afib, and Aspirin plavx dc'ed, eliquis started. Echo with small pericardial effusion. Admitted to [facility] team, MRI with left high frontal lobe compatible with acute infarction & an adjacent foci of restriction in the left um semiovale as well as in the left subinsular region and left occipital lobe. Suggestive of thromboembolic disease. Atorvastatin started at 80 mg then decreased to 40 mg, biktarvy started, valtrex started, bupropion started, HCTZ started. Started Eliquis. STACI 11/25 with PFO, no L atrial thrombus. Completed a cartotid u/s. No issues with medications. He completed an echo and carotid u/s today. We are discussing follow up and further care. He saw his PMD and blood pressure issues are still ongoing.

## 2021-07-06 NOTE — DISCHARGE NOTE NURSING/CASE MANAGEMENT/SOCIAL WORK - NSDCPEPTSTRK_GEN_ALL_CORE
Telephone Encounter by Bethany Katz RN at 7/5/2021  7:31 PM     Author: Bethany Katz RN Service: -- Author Type: Registered Nurse    Filed: 7/5/2021  7:32 PM Encounter Date: 7/5/2021 Status: Signed    : Bethany Katz RN (Registered Nurse)       Refill Approved    Rx renewed per Medication Renewal Policy. Medication was last renewed on 5/6/20, last OV 2/12/21.    Bethany Katz, [facility] Triage/Med Refill 7/5/2021     Requested Prescriptions   Pending Prescriptions Disp Refills   ? azelastine (OPTIVAR) 0.05 % ophthalmic solution [Pharmacy Med Name: AZELASTINE HCL 0.05 % SOLN 0.05 Solution] 6 mL 11     Sig: INSTILL 1 DROP TO BOTH EYES DAILY.       Opthalmic Allergy Drops Refill Protocol Passed - 7/5/2021  5:27 PM        Passed - Patient has had office visit/physical in last 12 months     Last office visit with prescriber/PCP: 2/12/2021 Barb Elias MD OR same dept: 2/12/2021 Barb Elias MD OR same specialty: 2/12/2021 Barb Elias MD  Last physical: 8/6/2020 Last MTM visit: Visit date not found   Next visit within 3 mo: Visit date not found  Next physical within 3 mo: Visit date not found  Prescriber OR PCP: Barb Elias MD  Last diagnosis associated with med order: 1. Encounter for medication refill  - azelastine (OPTIVAR) 0.05 % ophthalmic solution [Pharmacy Med Name: AZELASTINE HCL 0.05 % SOLN 0.05 Solution]; INSTILL 1 DROP TO BOTH EYES DAILY.  Dispense: 6 mL; Refill: 11    If protocol passes may refill for 12 months if within 3 months of last provider visit (or a total of 15 months).                                 
Need for follow up after discharge/Prescribed medications/Risk factors for stroke/Stroke education booklet/Stroke support groups for patients, families, and friends/Stroke warning signs and symptoms/Signs and symptoms of stroke/Call 911 for stroke

## 2021-08-30 ENCOUNTER — RESULT CHARGE (OUTPATIENT)
Age: 66
End: 2021-08-30

## 2021-08-30 ENCOUNTER — APPOINTMENT (OUTPATIENT)
Dept: HEART AND VASCULAR | Facility: CLINIC | Age: 66
End: 2021-08-30
Payer: MEDICARE

## 2021-08-30 ENCOUNTER — NON-APPOINTMENT (OUTPATIENT)
Age: 66
End: 2021-08-30

## 2021-08-30 VITALS
SYSTOLIC BLOOD PRESSURE: 96 MMHG | TEMPERATURE: 98.7 F | HEIGHT: 72 IN | BODY MASS INDEX: 24.45 KG/M2 | DIASTOLIC BLOOD PRESSURE: 60 MMHG | OXYGEN SATURATION: 99 % | HEART RATE: 59 BPM | WEIGHT: 180.5 LBS

## 2021-08-30 PROCEDURE — 99214 OFFICE O/P EST MOD 30 MIN: CPT

## 2021-08-30 PROCEDURE — 93000 ELECTROCARDIOGRAM COMPLETE: CPT

## 2021-08-30 RX ORDER — NIFEDIPINE 30 MG/1
30 TABLET, EXTENDED RELEASE ORAL
Qty: 90 | Refills: 3 | Status: COMPLETED | COMMUNITY
Start: 2021-02-02 | End: 2021-08-30

## 2021-08-31 NOTE — PHYSICAL EXAM
[Well Developed] : well developed [Well Nourished] : well nourished [No Acute Distress] : no acute distress [Normal Conjunctiva] : normal conjunctiva [No Carotid Bruit] : no carotid bruit [Normal Venous Pressure] : normal venous pressure [Normal S1, S2] : normal S1, S2 [No Murmur] : no murmur [No Rub] : no rub [No Gallop] : no gallop [Clear Lung Fields] : clear lung fields [Good Air Entry] : good air entry [No Respiratory Distress] : no respiratory distress  [Soft] : abdomen soft [Non Tender] : non-tender [No Masses/organomegaly] : no masses/organomegaly [Normal Bowel Sounds] : normal bowel sounds [Normal Gait] : normal gait [No Cyanosis] : no cyanosis [No Edema] : no edema [No Clubbing] : no clubbing [No Varicosities] : no varicosities [No Rash] : no rash [No Skin Lesions] : no skin lesions [Moves all extremities] : moves all extremities [No Focal Deficits] : no focal deficits [Normal Speech] : normal speech [Alert and Oriented] : alert and oriented [Normal memory] : normal memory

## 2021-08-31 NOTE — REASON FOR VISIT
[Symptom and Test Evaluation] : symptom and test evaluation [FreeTextEntry1] : Presley comes in for a follow up and re-evaluation. He is feeling well and lost a few lbs. He is feeling better, as he retired recently. Blood pressure is a bit low on this follow up. No dizziness or syncope noted. No issues with the medications. He needs to see his PMD as well.

## 2021-08-31 NOTE — DISCUSSION/SUMMARY
[FreeTextEntry1] : HTN cont current medications, will d/c CCB and re-evaluate, check renal u/s\par HLD MAHIN and JULIUS discussed his lipid panel and individualized target LDL goal. At this point, will do diet and exercise with anticipation of re-evaluating labs in 3-6 months; encourage a visit with PMD\par Claudications/PVD will check abdominal u/s to screen for AAA \par EKG section of the chart --- secondary to symptoms above an electrocardiogram also known as an EKG was performed.  Risks and benefits discussed with the patient. Patient was given time and privacy to changed into a gown. Shortly after, standard 10 leads were applied and a [employer] system was used to perform the study. The results were subsequently reviewed by attending physician and discussed with the patient. The study showed a normal sinus rhythm and no ST-T suggestive of ischemia. Order for the EKG was placed in the chart. The results were documented. Billing submitted. Emotional support provided.\par

## 2021-10-20 ENCOUNTER — APPOINTMENT (OUTPATIENT)
Dept: HEART AND VASCULAR | Facility: CLINIC | Age: 66
End: 2021-10-20
Payer: MEDICARE

## 2021-10-20 PROCEDURE — 99214 OFFICE O/P EST MOD 30 MIN: CPT

## 2021-10-20 PROCEDURE — 76706 US ABDL AORTA SCREEN AAA: CPT | Mod: 59

## 2021-10-20 PROCEDURE — 93976 VASCULAR STUDY: CPT

## 2021-10-21 NOTE — DISCUSSION/SUMMARY
[FreeTextEntry1] : HTN - MAHIN  and I had an extensive discussion regarding his blood pressure management. Patient will continue taking current medications in addition to maintaining a low Na diet, with periodic b/p checks at home.\par HLD MAHIN and I discussed his lipid panel and individualized target LDL goal. At this point, will do diet and exercise with anticipation of re-evaluating labs in 3-6 months\par s/p AAA screen we discussed results\par Claudication work up unremarkable so far. Inclined towards a conservative follow up in this patient. We had a careful discussion regarding diet and exercise. Will be happy to re-evaluate.\par

## 2021-11-30 ENCOUNTER — TRANSCRIPTION ENCOUNTER (OUTPATIENT)
Age: 66
End: 2021-11-30

## 2021-11-30 RX ORDER — RIVAROXABAN 20 MG/1
20 TABLET, FILM COATED ORAL
Qty: 90 | Refills: 3 | Status: DISCONTINUED | COMMUNITY
Start: 2019-12-03 | End: 2021-11-30

## 2022-03-09 ENCOUNTER — APPOINTMENT (OUTPATIENT)
Dept: HEART AND VASCULAR | Facility: CLINIC | Age: 67
End: 2022-03-09
Payer: MEDICARE

## 2022-03-09 ENCOUNTER — NON-APPOINTMENT (OUTPATIENT)
Age: 67
End: 2022-03-09

## 2022-03-09 VITALS
TEMPERATURE: 97.7 F | OXYGEN SATURATION: 98 % | HEIGHT: 72 IN | HEART RATE: 56 BPM | BODY MASS INDEX: 23.04 KG/M2 | WEIGHT: 170.13 LBS | SYSTOLIC BLOOD PRESSURE: 124 MMHG | DIASTOLIC BLOOD PRESSURE: 68 MMHG

## 2022-03-09 PROCEDURE — 99213 OFFICE O/P EST LOW 20 MIN: CPT

## 2022-03-09 NOTE — DISCUSSION/SUMMARY
[FreeTextEntry1] : HTN - MAHIN  and JULIUS had an extensive discussion regarding his blood pressure management. Patient will continue taking current medications in addition to maintaining a low Na diet, with periodic b/p checks at home.\par HLD MAHIN and I discussed his lipid panel and individualized target LDL goal. At this point, will do diet and exercise with anticipation of re-evaluating labs in 3-6 months\par CVA check carotid \par EKG section of the chart --- secondary to symptoms above an electrocardiogram also known as an EKG was performed.  Risks and benefits discussed with the patient. Patient was given time and privacy to changed into a gown. Shortly after, standard 10 leads were applied and a [employer] system was used to perform the study. The results were subsequently reviewed by attending physician and discussed with the patient. The study showed a normal sinus rhythm and no ST-T suggestive of ischemia. Order for the EKG was placed in the chart. The results were documented. Billing submitted. Emotional support provided.\par

## 2022-08-09 ENCOUNTER — APPOINTMENT (OUTPATIENT)
Dept: HEART AND VASCULAR | Facility: CLINIC | Age: 67
End: 2022-08-09

## 2022-08-09 VITALS
TEMPERATURE: 97.1 F | WEIGHT: 162 LBS | HEART RATE: 62 BPM | OXYGEN SATURATION: 99 % | DIASTOLIC BLOOD PRESSURE: 69 MMHG | SYSTOLIC BLOOD PRESSURE: 120 MMHG | HEIGHT: 72 IN | BODY MASS INDEX: 21.94 KG/M2

## 2022-08-09 PROCEDURE — 93880 EXTRACRANIAL BILAT STUDY: CPT

## 2022-08-09 PROCEDURE — 99214 OFFICE O/P EST MOD 30 MIN: CPT

## 2022-08-09 PROCEDURE — 93306 TTE W/DOPPLER COMPLETE: CPT

## 2022-08-09 RX ORDER — BISOPROLOL FUMARATE 5 MG/1
5 TABLET, FILM COATED ORAL
Qty: 90 | Refills: 3 | Status: COMPLETED | COMMUNITY
End: 2022-08-09

## 2022-08-09 RX ORDER — VALSARTAN AND HYDROCHLOROTHIAZIDE 320; 25 MG/1; MG/1
320-25 TABLET, FILM COATED ORAL DAILY
Qty: 90 | Refills: 3 | Status: COMPLETED | COMMUNITY
End: 2022-08-09

## 2022-08-09 NOTE — DISCUSSION/SUMMARY
[FreeTextEntry1] : HTN - MAHIN  and I had an extensive discussion regarding his blood pressure management. Patient will continue taking current medications in addition to maintaining a low Na diet, with periodic b/p checks at home.\par HLD MAHIN and I discussed his lipid panel and individualized target LDL goal. At this point, will do diet and exercise with anticipation of re-evaluating labs in 3-6 months\par Carotid disease minor disease noted on exam; will manage medically with current medications and re-evaluate lipids in 3-6 months\par

## 2022-08-09 NOTE — REASON FOR VISIT
[Symptom and Test Evaluation] : symptom and test evaluation [FreeTextEntry1] : Presley comes in for a follow up and re-evaluation. He is feeling well and lost a few lbs. He is feeling better, as he retired recently. Blood pressure is better on this follow up. No dizziness or syncope noted. No issues with the medications. Ongoing work up with PMD

## 2022-11-09 ENCOUNTER — NON-APPOINTMENT (OUTPATIENT)
Age: 67
End: 2022-11-09

## 2022-11-09 ENCOUNTER — APPOINTMENT (OUTPATIENT)
Dept: HEART AND VASCULAR | Facility: CLINIC | Age: 67
End: 2022-11-09

## 2022-11-09 VITALS
WEIGHT: 156 LBS | HEART RATE: 58 BPM | TEMPERATURE: 98 F | HEIGHT: 72 IN | OXYGEN SATURATION: 99 % | SYSTOLIC BLOOD PRESSURE: 156 MMHG | BODY MASS INDEX: 21.13 KG/M2 | DIASTOLIC BLOOD PRESSURE: 86 MMHG

## 2022-11-09 VITALS — DIASTOLIC BLOOD PRESSURE: 84 MMHG | SYSTOLIC BLOOD PRESSURE: 133 MMHG

## 2022-11-09 PROCEDURE — 99214 OFFICE O/P EST MOD 30 MIN: CPT

## 2022-11-09 PROCEDURE — 93000 ELECTROCARDIOGRAM COMPLETE: CPT

## 2022-11-14 ENCOUNTER — TRANSCRIPTION ENCOUNTER (OUTPATIENT)
Age: 67
End: 2022-11-14

## 2022-11-14 NOTE — DISCUSSION/SUMMARY
[FreeTextEntry1] : Pre-op (hernia repair) From cardiovascular standpoint, Mr. BERRIOS is of acceptable risk for the planned procedure and may move forward without further cardiovascular testing.\par HTN - MAHIN  and I had an extensive discussion regarding his blood pressure management. Patient will continue taking current medications in addition to maintaining a low Na diet, with periodic b/p checks at home.\par HLD MAHIN and I discussed his lipid panel and individualized target LDL goal. At this point, will do diet and exercise with anticipation of re-evaluating labs in 3-6 months\par Claudications check ANASTASIA and dopplers if able to approve and schedule.\par EKG section of the chart --- secondary to symptoms above an electrocardiogram also known as an EKG was performed.  Risks and benefits discussed with the patient. Patient was given time and privacy to changed into a gown. Shortly after, standard 10 leads were applied and a [employer] system was used to perform the study. The results were subsequently reviewed by attending physician and discussed with the patient. The study showed a normal sinus rhythm and no ST-T suggestive of ischemia. Order for the EKG was placed in the chart. The results were documented. Billing submitted. Emotional support provided.\par

## 2022-11-14 NOTE — REASON FOR VISIT
[Symptom and Test Evaluation] : symptom and test evaluation [FreeTextEntry1] : Presley comes in for a follow up and re-evaluation. He is feeling well and lost a few lbs. He is feeling better, as he retired recently. Blood pressure is better on this follow up. No dizziness or syncope noted. No issues with the medications. Ongoing work up with PMD. In addition, he is scheduled for a hernia repair with Dr Valentin. No issues with anesthesia in the past.

## 2022-11-15 ENCOUNTER — TRANSCRIPTION ENCOUNTER (OUTPATIENT)
Age: 67
End: 2022-11-15

## 2022-12-07 ENCOUNTER — RX RENEWAL (OUTPATIENT)
Age: 67
End: 2022-12-07

## 2022-12-15 ENCOUNTER — TRANSCRIPTION ENCOUNTER (OUTPATIENT)
Age: 67
End: 2022-12-15

## 2022-12-20 ENCOUNTER — APPOINTMENT (OUTPATIENT)
Dept: HEART AND VASCULAR | Facility: CLINIC | Age: 67
End: 2022-12-20

## 2022-12-20 VITALS
WEIGHT: 157 LBS | SYSTOLIC BLOOD PRESSURE: 134 MMHG | BODY MASS INDEX: 21.26 KG/M2 | HEIGHT: 72 IN | OXYGEN SATURATION: 99 % | TEMPERATURE: 98.3 F | HEART RATE: 71 BPM | DIASTOLIC BLOOD PRESSURE: 72 MMHG

## 2022-12-20 PROCEDURE — 99214 OFFICE O/P EST MOD 30 MIN: CPT

## 2022-12-22 NOTE — REASON FOR VISIT
[Symptom and Test Evaluation] : symptom and test evaluation [Hypertension] : hypertension [FreeTextEntry1] : Presley comes in for a re-evaluation of blood pressure. He has been feeling well. However, it seems that his out-patient readings have been elevated. We decided to move up his follow up and explore further.

## 2022-12-22 NOTE — DISCUSSION/SUMMARY
[FreeTextEntry1] : HTN - MAHIN  and I had an extensive discussion regarding his blood pressure management. Patient will continue taking current medications in addition to maintaining a low Na diet, with periodic b/p checks at home.\par HLD MAHIN and I discussed his lipid panel and individualized target LDL goal. At this point, will do diet and exercise with anticipation of re-evaluating labs in 3-6 months\par Carotid disease minor disease noted on exam; will manage medically with current medications and re-evaluate lipids in 3-6 months\par PVD eval in the past. conservative follow up

## 2023-01-22 ENCOUNTER — TRANSCRIPTION ENCOUNTER (OUTPATIENT)
Age: 68
End: 2023-01-22

## 2023-01-22 ENCOUNTER — RX RENEWAL (OUTPATIENT)
Age: 68
End: 2023-01-22

## 2023-01-23 ENCOUNTER — TRANSCRIPTION ENCOUNTER (OUTPATIENT)
Age: 68
End: 2023-01-23

## 2023-03-01 ENCOUNTER — APPOINTMENT (OUTPATIENT)
Dept: HEART AND VASCULAR | Facility: CLINIC | Age: 68
End: 2023-03-01
Payer: MEDICARE

## 2023-03-01 VITALS
TEMPERATURE: 96.7 F | HEIGHT: 72 IN | DIASTOLIC BLOOD PRESSURE: 79 MMHG | HEART RATE: 74 BPM | SYSTOLIC BLOOD PRESSURE: 147 MMHG | OXYGEN SATURATION: 98 % | WEIGHT: 155 LBS | BODY MASS INDEX: 20.99 KG/M2

## 2023-03-01 DIAGNOSIS — Z86.79 PERSONAL HISTORY OF OTHER DISEASES OF THE CIRCULATORY SYSTEM: ICD-10-CM

## 2023-03-01 PROCEDURE — 99214 OFFICE O/P EST MOD 30 MIN: CPT

## 2023-03-01 NOTE — REASON FOR VISIT
Spoke with pt regarding below message. She started experiencing diarrhea on Sunday night. States she was unable to get off the toilet for more then 10 minutes before she had another episode. No blood in stool. Describes diarrhea as uncontrollable and explosive. She took Imodium with no relief. Diarrhea lasted until 10 am Monday morning, then she started vomiting. Vomited a few times, bringing up mostly bile. Monday night her symptoms improved slightly and she was able to eat some soup. Has been drinking tea and water. Today pt states that her symptoms have come back. Experiencing multiple episodes of diarrhea, has gone over 6 times in the last 45 minutes. Also feels like she may vomit again. No abdominal pain, having some gas. She is unsure if she has a fever but she is sweating. Pt is also diabetic. She has been checking her sugars and taking her insulin still. BS have been stable between 115-180.     Writer advised pt that based on severity of symptoms she needs to be seen in the ER. Pt states she can not leave the bathroom for that long and she has no ride. Advised pt that if she can not find someone to take her she will need to call 911. Pt reluctant to call 911 but states understanding of plan.       Reason for Disposition  • [1] Drinking very little AND [2] dehydration suspected (e.g., no urine > 12 hours, very dry mouth, very lightheaded)    Protocols used: DIARRHEA-A-     [Symptom and Test Evaluation] : symptom and test evaluation [FreeTextEntry1] : Presley comes in for a re-evaluation of blood pressure. He has been feeling well. However, it seems that his out-patient readings have been elevated. We decided to move up his follow up and explore further.

## 2023-03-01 NOTE — DISCUSSION/SUMMARY
[FreeTextEntry1] : HTN cont diovan and add norvasc 5 mg QD\par HLD MAHIN and I discussed his lipid panel and individualized target LDL goal. At this point, will do diet and exercise with anticipation of re-evaluating labs in 3-6 months\par CVA/weight loss will re-evaluate in 1 m

## 2023-04-05 ENCOUNTER — APPOINTMENT (OUTPATIENT)
Dept: HEART AND VASCULAR | Facility: CLINIC | Age: 68
End: 2023-04-05
Payer: MEDICARE

## 2023-04-05 ENCOUNTER — NON-APPOINTMENT (OUTPATIENT)
Age: 68
End: 2023-04-05

## 2023-04-05 VITALS
BODY MASS INDEX: 21.13 KG/M2 | SYSTOLIC BLOOD PRESSURE: 133 MMHG | HEIGHT: 72 IN | DIASTOLIC BLOOD PRESSURE: 72 MMHG | WEIGHT: 156 LBS | HEART RATE: 70 BPM | OXYGEN SATURATION: 99 %

## 2023-04-05 PROCEDURE — 99214 OFFICE O/P EST MOD 30 MIN: CPT

## 2023-04-05 NOTE — DISCUSSION/SUMMARY
[FreeTextEntry1] : CVA history check carotid if able to approve and schedule.\par HTN - MAHIN  and I had an extensive discussion regarding his blood pressure management. Patient will continue taking current medications in addition to maintaining a low Na diet, with periodic b/p checks at home.\par HLD MAHIN and I discussed his lipid panel and individualized target LDL goal. At this point, will do diet and exercise with anticipation of re-evaluating labs in 3-6 months\par SOB check echo if able to approve and schedule.\par

## 2023-04-05 NOTE — PHYSICAL EXAM
[Well Developed] : well developed [No Acute Distress] : no acute distress [Well Nourished] : well nourished [Normal Conjunctiva] : normal conjunctiva [Normal Venous Pressure] : normal venous pressure [No Carotid Bruit] : no carotid bruit [Normal S1, S2] : normal S1, S2 [No Murmur] : no murmur [No Rub] : no rub [No Gallop] : no gallop [Good Air Entry] : good air entry [Clear Lung Fields] : clear lung fields [No Respiratory Distress] : no respiratory distress  [Soft] : abdomen soft [Non Tender] : non-tender [No Masses/organomegaly] : no masses/organomegaly [Normal Bowel Sounds] : normal bowel sounds [Normal Gait] : normal gait [No Edema] : no edema [No Cyanosis] : no cyanosis [No Clubbing] : no clubbing [No Varicosities] : no varicosities [No Rash] : no rash [No Skin Lesions] : no skin lesions [Moves all extremities] : moves all extremities [No Focal Deficits] : no focal deficits [Normal Speech] : normal speech [Alert and Oriented] : alert and oriented [Normal memory] : normal memory

## 2023-04-05 NOTE — REASON FOR VISIT
[Symptom and Test Evaluation] : symptom and test evaluation [FreeTextEntry1] : Presley comes in for a re-evaluation of blood pressure. He has been feeling well. blood pressure is better on follow up

## 2023-04-12 ENCOUNTER — NON-APPOINTMENT (OUTPATIENT)
Age: 68
End: 2023-04-12

## 2023-04-12 ENCOUNTER — TRANSCRIPTION ENCOUNTER (OUTPATIENT)
Age: 68
End: 2023-04-12

## 2023-04-13 ENCOUNTER — TRANSCRIPTION ENCOUNTER (OUTPATIENT)
Age: 68
End: 2023-04-13

## 2023-05-28 ENCOUNTER — TRANSCRIPTION ENCOUNTER (OUTPATIENT)
Age: 68
End: 2023-05-28

## 2023-06-18 NOTE — PATIENT PROFILE ADULT - BRADEN SENSORY
4) no impairment

## 2023-08-04 ENCOUNTER — APPOINTMENT (OUTPATIENT)
Dept: HEART AND VASCULAR | Facility: CLINIC | Age: 68
End: 2023-08-04
Payer: MEDICARE

## 2023-08-04 VITALS
HEIGHT: 72 IN | SYSTOLIC BLOOD PRESSURE: 132 MMHG | WEIGHT: 165 LBS | OXYGEN SATURATION: 97 % | DIASTOLIC BLOOD PRESSURE: 75 MMHG | BODY MASS INDEX: 22.35 KG/M2 | HEART RATE: 85 BPM

## 2023-08-04 PROCEDURE — 93880 EXTRACRANIAL BILAT STUDY: CPT

## 2023-08-04 PROCEDURE — 93306 TTE W/DOPPLER COMPLETE: CPT

## 2023-08-04 PROCEDURE — 99214 OFFICE O/P EST MOD 30 MIN: CPT

## 2023-08-04 NOTE — DISCUSSION/SUMMARY
[FreeTextEntry1] : SOB/CP echo reviewed. Inclined towards a conservative follow up in this patient. We had a careful discussion regarding diet and exercise. Will be happy to re-evaluate. HTN - MAHIN  and I had an extensive discussion regarding his blood pressure management. Patient will continue taking current medications in addition to maintaining a low Na diet, with periodic b/p checks at home. HLD MAHIN and I discussed his lipid panel and individualized target LDL goal. At this point, will do diet and exercise with anticipation of re-evaluating labs in 3-6 months AF cont eliquis Carotid disease minor disease noted on exam; will manage medically with current medications and re-evaluate lipids in 3-6 months

## 2023-08-04 NOTE — REASON FOR VISIT
[Symptom and Test Evaluation] : symptom and test evaluation [FreeTextEntry1] : Mr. BERRIOS presents for a follow up today. He denies chest pain, dyspnea or palpitations. No issues reported with his medications. Otherwise, he is feeling well.

## 2023-08-31 ENCOUNTER — TRANSCRIPTION ENCOUNTER (OUTPATIENT)
Age: 68
End: 2023-08-31

## 2024-01-16 ENCOUNTER — NON-APPOINTMENT (OUTPATIENT)
Age: 69
End: 2024-01-16

## 2024-01-16 ENCOUNTER — APPOINTMENT (OUTPATIENT)
Dept: HEART AND VASCULAR | Facility: CLINIC | Age: 69
End: 2024-01-16
Payer: MEDICARE

## 2024-01-16 VITALS
HEIGHT: 72 IN | HEART RATE: 92 BPM | WEIGHT: 189 LBS | TEMPERATURE: 97.1 F | SYSTOLIC BLOOD PRESSURE: 118 MMHG | DIASTOLIC BLOOD PRESSURE: 73 MMHG | BODY MASS INDEX: 25.6 KG/M2 | OXYGEN SATURATION: 100 %

## 2024-01-16 DIAGNOSIS — I10 ESSENTIAL (PRIMARY) HYPERTENSION: ICD-10-CM

## 2024-01-16 DIAGNOSIS — E78.5 HYPERLIPIDEMIA, UNSPECIFIED: ICD-10-CM

## 2024-01-16 DIAGNOSIS — I63.412 CEREBRAL INFARCTION DUE TO EMBOLISM OF LEFT MIDDLE CEREBRAL ARTERY: ICD-10-CM

## 2024-01-16 PROCEDURE — 99214 OFFICE O/P EST MOD 30 MIN: CPT

## 2024-01-16 PROCEDURE — 93000 ELECTROCARDIOGRAM COMPLETE: CPT

## 2024-01-16 PROCEDURE — G2211 COMPLEX E/M VISIT ADD ON: CPT

## 2024-01-16 RX ORDER — APIXABAN 5 MG/1
5 TABLET, FILM COATED ORAL
Qty: 180 | Refills: 3 | Status: ACTIVE | COMMUNITY
Start: 2021-11-30 | End: 1900-01-01

## 2024-01-16 RX ORDER — VALSARTAN 320 MG/1
320 TABLET, COATED ORAL DAILY
Qty: 90 | Refills: 3 | Status: ACTIVE | COMMUNITY
Start: 2022-07-11 | End: 1900-01-01

## 2024-01-16 RX ORDER — AMLODIPINE BESYLATE 5 MG/1
5 TABLET ORAL DAILY
Qty: 1 | Refills: 3 | Status: ACTIVE | COMMUNITY
Start: 2023-03-01 | End: 1900-01-01

## 2024-01-16 RX ORDER — ATORVASTATIN CALCIUM 20 MG/1
20 TABLET, FILM COATED ORAL
Qty: 1 | Refills: 3 | Status: ACTIVE | COMMUNITY
Start: 2022-12-07 | End: 1900-01-01

## 2024-01-16 NOTE — DISCUSSION/SUMMARY
[FreeTextEntry1] : HLD labs reviewed. reduce lipitor to 20 mg QD HTN - MAHIN  and I had an extensive discussion regarding his blood pressure management. Patient will continue taking current medications in addition to maintaining a low Na diet, with periodic b/p checks at home. CVA Inclined towards a conservative follow up in this patient. We had a careful discussion regarding diet and exercise. Will be happy to re-evaluate. EKG section of the chart --- secondary to symptoms above an electrocardiogram also known as an EKG was performed.  Risks and benefits discussed with the patient. Patient was given time and privacy to changed into a gown. Shortly after, standard 10 leads were applied and a [employer] system was used to perform the study. The results were subsequently reviewed by attending physician and discussed with the patient. The study showed a normal sinus rhythm and no ST-T suggestive of ischemia. Order for the EKG was placed in the chart. The results were documented. Billing submitted. [EKG obtained to assist in diagnosis and management of assessed problem(s)] : EKG obtained to assist in diagnosis and management of assessed problem(s)

## 2024-04-26 ENCOUNTER — NON-APPOINTMENT (OUTPATIENT)
Age: 69
End: 2024-04-26

## 2024-04-26 ENCOUNTER — APPOINTMENT (OUTPATIENT)
Dept: OPHTHALMOLOGY | Facility: CLINIC | Age: 69
End: 2024-04-26
Payer: MEDICARE

## 2024-04-26 PROCEDURE — 92002 INTRM OPH EXAM NEW PATIENT: CPT

## 2024-04-29 ENCOUNTER — TRANSCRIPTION ENCOUNTER (OUTPATIENT)
Age: 69
End: 2024-04-29

## 2024-05-06 ENCOUNTER — TRANSCRIPTION ENCOUNTER (OUTPATIENT)
Age: 69
End: 2024-05-06

## 2024-05-07 ENCOUNTER — TRANSCRIPTION ENCOUNTER (OUTPATIENT)
Age: 69
End: 2024-05-07

## 2024-05-08 ENCOUNTER — TRANSCRIPTION ENCOUNTER (OUTPATIENT)
Age: 69
End: 2024-05-08

## 2024-07-08 ENCOUNTER — NON-APPOINTMENT (OUTPATIENT)
Age: 69
End: 2024-07-08

## 2024-07-08 ENCOUNTER — APPOINTMENT (OUTPATIENT)
Dept: HEART AND VASCULAR | Facility: CLINIC | Age: 69
End: 2024-07-08
Payer: MEDICARE

## 2024-07-08 VITALS
BODY MASS INDEX: 22.48 KG/M2 | WEIGHT: 166 LBS | SYSTOLIC BLOOD PRESSURE: 129 MMHG | OXYGEN SATURATION: 98 % | HEART RATE: 63 BPM | DIASTOLIC BLOOD PRESSURE: 76 MMHG | HEIGHT: 72 IN

## 2024-07-08 DIAGNOSIS — I10 ESSENTIAL (PRIMARY) HYPERTENSION: ICD-10-CM

## 2024-07-08 DIAGNOSIS — E78.5 HYPERLIPIDEMIA, UNSPECIFIED: ICD-10-CM

## 2024-07-08 DIAGNOSIS — I63.412 CEREBRAL INFARCTION DUE TO EMBOLISM OF LEFT MIDDLE CEREBRAL ARTERY: ICD-10-CM

## 2024-07-08 PROCEDURE — 93000 ELECTROCARDIOGRAM COMPLETE: CPT

## 2024-07-08 PROCEDURE — G2211 COMPLEX E/M VISIT ADD ON: CPT

## 2024-07-08 PROCEDURE — 99214 OFFICE O/P EST MOD 30 MIN: CPT

## 2024-08-01 NOTE — H&P ADULT - NSICDXNOPASTSURGICALHX_GEN_ALL_CORE
Addended by: WILLIAM TSAI on: 8/1/2024 03:05 PM     Modules accepted: Orders    
<-- Click to add NO significant Past Surgical History

## 2024-12-17 ENCOUNTER — APPOINTMENT (OUTPATIENT)
Dept: HEART AND VASCULAR | Facility: CLINIC | Age: 69
End: 2024-12-17
Payer: MEDICARE

## 2024-12-17 VITALS
OXYGEN SATURATION: 96 % | HEART RATE: 64 BPM | HEIGHT: 72 IN | WEIGHT: 166.25 LBS | TEMPERATURE: 97.6 F | SYSTOLIC BLOOD PRESSURE: 131 MMHG | DIASTOLIC BLOOD PRESSURE: 73 MMHG | BODY MASS INDEX: 22.52 KG/M2

## 2024-12-17 DIAGNOSIS — E78.5 HYPERLIPIDEMIA, UNSPECIFIED: ICD-10-CM

## 2024-12-17 DIAGNOSIS — I63.412 CEREBRAL INFARCTION DUE TO EMBOLISM OF LEFT MIDDLE CEREBRAL ARTERY: ICD-10-CM

## 2024-12-17 DIAGNOSIS — I10 ESSENTIAL (PRIMARY) HYPERTENSION: ICD-10-CM

## 2024-12-17 PROCEDURE — 93922 UPR/L XTREMITY ART 2 LEVELS: CPT

## 2024-12-17 PROCEDURE — 93306 TTE W/DOPPLER COMPLETE: CPT

## 2024-12-17 PROCEDURE — G2211 COMPLEX E/M VISIT ADD ON: CPT

## 2024-12-17 PROCEDURE — 93925 LOWER EXTREMITY STUDY: CPT

## 2024-12-17 PROCEDURE — 99214 OFFICE O/P EST MOD 30 MIN: CPT

## 2025-02-12 NOTE — DIETITIAN INITIAL EVALUATION ADULT. - PROBLEM SELECTOR PLAN 6
Called pt to discuss his request to schedule the surgery. No answer. VM Full.   1) PCP Contacted on Admission: (Y/N) --> Name & Phone #:  2) Date of Contact with PCP:  3) PCP Contacted at Discharge: (Y/N)  4) Summary of Handoff Given to PCP:   5) Post-Discharge Appointment Date and Location:

## 2025-06-16 ENCOUNTER — NON-APPOINTMENT (OUTPATIENT)
Age: 70
End: 2025-06-16

## 2025-06-18 ENCOUNTER — APPOINTMENT (OUTPATIENT)
Dept: HEART AND VASCULAR | Facility: CLINIC | Age: 70
End: 2025-06-18
Payer: MEDICARE

## 2025-06-18 VITALS
HEART RATE: 56 BPM | SYSTOLIC BLOOD PRESSURE: 133 MMHG | HEIGHT: 72 IN | WEIGHT: 169 LBS | BODY MASS INDEX: 22.89 KG/M2 | DIASTOLIC BLOOD PRESSURE: 75 MMHG | OXYGEN SATURATION: 98 %

## 2025-06-18 PROCEDURE — 93880 EXTRACRANIAL BILAT STUDY: CPT

## 2025-06-18 PROCEDURE — G2211 COMPLEX E/M VISIT ADD ON: CPT

## 2025-06-18 PROCEDURE — 99214 OFFICE O/P EST MOD 30 MIN: CPT
